# Patient Record
Sex: MALE | Race: OTHER | HISPANIC OR LATINO | Employment: OTHER | RURAL
[De-identification: names, ages, dates, MRNs, and addresses within clinical notes are randomized per-mention and may not be internally consistent; named-entity substitution may affect disease eponyms.]

---

## 2023-04-26 ENCOUNTER — OFFICE VISIT (OUTPATIENT)
Dept: FAMILY MEDICINE | Facility: CLINIC | Age: 73
End: 2023-04-26
Payer: MEDICARE

## 2023-04-26 VITALS
OXYGEN SATURATION: 97 % | SYSTOLIC BLOOD PRESSURE: 188 MMHG | WEIGHT: 150 LBS | DIASTOLIC BLOOD PRESSURE: 97 MMHG | BODY MASS INDEX: 24.99 KG/M2 | HEIGHT: 65 IN | TEMPERATURE: 98 F | HEART RATE: 69 BPM

## 2023-04-26 DIAGNOSIS — Z13.220 SCREENING FOR CHOLESTEROL LEVEL: ICD-10-CM

## 2023-04-26 DIAGNOSIS — Z11.59 ENCOUNTER FOR HEPATITIS C SCREENING TEST FOR LOW RISK PATIENT: ICD-10-CM

## 2023-04-26 DIAGNOSIS — Z13.6 ENCOUNTER FOR ABDOMINAL AORTIC ANEURYSM (AAA) SCREENING: Primary | ICD-10-CM

## 2023-04-26 DIAGNOSIS — Z12.11 ENCOUNTER FOR SCREENING FOR MALIGNANT NEOPLASM OF COLON: ICD-10-CM

## 2023-04-26 DIAGNOSIS — I10 PRIMARY HYPERTENSION: ICD-10-CM

## 2023-04-26 DIAGNOSIS — N40.0 BENIGN PROSTATIC HYPERPLASIA, UNSPECIFIED WHETHER LOWER URINARY TRACT SYMPTOMS PRESENT: ICD-10-CM

## 2023-04-26 PROCEDURE — 86803 HEPATITIS C AB TEST: CPT | Mod: ,,, | Performed by: CLINICAL MEDICAL LABORATORY

## 2023-04-26 PROCEDURE — 36415 COLL VENOUS BLD VENIPUNCTURE: CPT | Mod: ,,, | Performed by: CLINICAL MEDICAL LABORATORY

## 2023-04-26 PROCEDURE — 99205 OFFICE O/P NEW HI 60 MIN: CPT | Mod: GC,,, | Performed by: FAMILY MEDICINE

## 2023-04-26 PROCEDURE — 36415 PR COLLECTION VENOUS BLOOD,VENIPUNCTURE: ICD-10-PCS | Mod: ,,, | Performed by: CLINICAL MEDICAL LABORATORY

## 2023-04-26 PROCEDURE — 99205 PR OFFICE/OUTPT VISIT, NEW, LEVL V, 60-74 MIN: ICD-10-PCS | Mod: GC,,, | Performed by: FAMILY MEDICINE

## 2023-04-26 PROCEDURE — 80061 LIPID PANEL: ICD-10-PCS | Mod: ,,, | Performed by: CLINICAL MEDICAL LABORATORY

## 2023-04-26 PROCEDURE — 80061 LIPID PANEL: CPT | Mod: ,,, | Performed by: CLINICAL MEDICAL LABORATORY

## 2023-04-26 PROCEDURE — 86803 HEPATITIS C ANTIBODY: ICD-10-PCS | Mod: ,,, | Performed by: CLINICAL MEDICAL LABORATORY

## 2023-04-26 PROCEDURE — 99406 PR TOBACCO USE CESSATION INTERMEDIATE 3-10 MINUTES: ICD-10-PCS | Mod: GC,,, | Performed by: FAMILY MEDICINE

## 2023-04-26 PROCEDURE — 99406 BEHAV CHNG SMOKING 3-10 MIN: CPT | Mod: GC,,, | Performed by: FAMILY MEDICINE

## 2023-04-26 RX ORDER — AMLODIPINE BESYLATE 10 MG/1
10 TABLET ORAL DAILY
Qty: 90 TABLET | Refills: 6 | Status: SHIPPED | OUTPATIENT
Start: 2023-04-26 | End: 2023-10-16 | Stop reason: SDUPTHER

## 2023-04-26 RX ORDER — TAMSULOSIN HYDROCHLORIDE 0.4 MG/1
0.4 CAPSULE ORAL DAILY
Qty: 90 CAPSULE | Refills: 6 | Status: SHIPPED | OUTPATIENT
Start: 2023-04-26 | End: 2023-10-16 | Stop reason: SDUPTHER

## 2023-04-26 RX ORDER — LOSARTAN POTASSIUM 100 MG/1
100 TABLET ORAL DAILY
COMMUNITY
End: 2023-04-26 | Stop reason: SDUPTHER

## 2023-04-26 RX ORDER — AMLODIPINE BESYLATE 10 MG/1
10 TABLET ORAL DAILY
COMMUNITY
End: 2023-04-26 | Stop reason: SDUPTHER

## 2023-04-26 RX ORDER — LOSARTAN POTASSIUM 100 MG/1
100 TABLET ORAL DAILY
Qty: 90 TABLET | Refills: 6 | Status: SHIPPED | OUTPATIENT
Start: 2023-04-26 | End: 2023-10-16 | Stop reason: SDUPTHER

## 2023-04-26 RX ORDER — TAMSULOSIN HYDROCHLORIDE 0.4 MG/1
CAPSULE ORAL DAILY
COMMUNITY
End: 2023-04-26 | Stop reason: SDUPTHER

## 2023-04-26 NOTE — PROGRESS NOTES
Health Maintenance Due  Topic   Hepatitis C Screening  - ordered   Lipid Panel - ordered   COVID-19 Vaccine (1) - declined   TETANUS VACCINE - unable to give due to insurance   Colorectal Cancer Screening - ordered   Shingles Vaccine (1 of 2) - unable to give due to insurance   Pneumococcal Vaccines (Age 65+) (1 - PCV) - unable to give due to insurance

## 2023-04-27 LAB
CHOLEST SERPL-MCNC: 151 MG/DL (ref 0–200)
CHOLEST/HDLC SERPL: 3.2 {RATIO}
HCV AB SER QL: NORMAL
HDLC SERPL-MCNC: 47 MG/DL (ref 40–60)
LDLC SERPL CALC-MCNC: 77 MG/DL
LDLC/HDLC SERPL: 1.6 {RATIO}
NONHDLC SERPL-MCNC: 104 MG/DL
TRIGL SERPL-MCNC: 133 MG/DL (ref 35–150)
VLDLC SERPL-MCNC: 27 MG/DL

## 2023-05-02 PROBLEM — Z11.59 ENCOUNTER FOR HEPATITIS C SCREENING TEST FOR LOW RISK PATIENT: Status: ACTIVE | Noted: 2023-05-02

## 2023-05-02 PROBLEM — I10 PRIMARY HYPERTENSION: Status: ACTIVE | Noted: 2023-05-02

## 2023-05-02 PROBLEM — Z12.11 ENCOUNTER FOR SCREENING FOR MALIGNANT NEOPLASM OF COLON: Status: ACTIVE | Noted: 2023-05-02

## 2023-05-02 PROBLEM — N40.0 BENIGN PROSTATIC HYPERPLASIA: Status: ACTIVE | Noted: 2023-05-02

## 2023-05-02 PROBLEM — Z13.6 ENCOUNTER FOR ABDOMINAL AORTIC ANEURYSM (AAA) SCREENING: Status: ACTIVE | Noted: 2023-05-02

## 2023-05-02 PROBLEM — Z13.220 SCREENING FOR CHOLESTEROL LEVEL: Status: ACTIVE | Noted: 2023-05-02

## 2023-05-02 NOTE — PROGRESS NOTES
Subjective:       Patient ID: Johnny Haynes is a 73 y.o. male.    Chief Complaint: Establish Care    Johnny Haynes is a 73. y.o.m. with a PMH of HTN, BPH and Tobacco dependency that presents to the clinic today to establish care and for medication refills. Patient is a current smoker and was counseled on smoking for about 5-6 min. Astrid was given written documentation on steps to help stop smoking and was given the [facility] program brochure. Patient scheduled for AAA screening. Patient reports he has had his COVID vaccinations and will bring his card to f/u. Patient states he receive the pneumococcal vaccine at f/u. Patient referred to GI for screening c-scope. Upon examination patient left contains a mass between the 1st and second digit that extends on into the palm. Patient reports he has seen a hand specialist about the mass and he was told it was a lipoma and could be removed if he wanted it to. Patient reports the mass is not bothering him.          Current Outpatient Medications:     amLODIPine (NORVASC) 10 MG tablet, Take 1 tablet (10 mg total) by mouth once daily., Disp: 90 tablet, Rfl: 6    losartan (COZAAR) 100 MG tablet, Take 1 tablet (100 mg total) by mouth once daily., Disp: 90 tablet, Rfl: 6    tamsulosin (FLOMAX) 0.4 mg Cap, Take 1 capsule (0.4 mg total) by mouth once daily., Disp: 90 capsule, Rfl: 6    Review of patient's allergies indicates:  No Known Allergies    Past Medical History:   Diagnosis Date    Hypertension        Past Surgical History:   Procedure Laterality Date    CARDIAC SURGERY      COLONOSCOPY         Family History   Problem Relation Age of Onset    Heart disease Mother     Aneurysm Mother     Heart disease Father     Cancer Father     Diabetes Sister     Eating disorder Brother     Cancer Maternal Aunt     Heart disease Maternal Uncle     Aneurysm Maternal Grandmother     Diabetes Maternal Grandfather     Diabetes Paternal Grandfather        Social History      Tobacco Use    Smoking status: Every Day     Types: Cigarettes    Smokeless tobacco: Never   Substance Use Topics    Alcohol use: Not Currently    Drug use: Never       Review of Systems   Constitutional:  Negative for activity change, appetite change, chills and fever.   HENT:  Negative for nasal congestion, hearing loss, nosebleeds and trouble swallowing.    Eyes:  Negative for visual disturbance.   Respiratory:  Negative for cough and shortness of breath.    Cardiovascular:  Negative for chest pain and leg swelling.   Gastrointestinal:  Negative for abdominal pain, blood in stool, change in bowel habit, constipation, diarrhea, nausea, vomiting and change in bowel habit.   Endocrine: Negative for polyuria.   Genitourinary:  Negative for difficulty urinating, dysuria and frequency.   Musculoskeletal:  Negative for joint swelling and leg pain.   Integumentary:  Negative for rash.   Neurological:  Negative for dizziness, weakness, light-headedness and headaches.   All other systems reviewed and are negative.      Current Medications:   Medication List with Changes/Refills   Changed and/or Refilled Medications    Modified Medication Previous Medication    AMLODIPINE (NORVASC) 10 MG TABLET amLODIPine (NORVASC) 10 MG tablet       Take 1 tablet (10 mg total) by mouth once daily.    Take 10 mg by mouth once daily.       Start Date: 4/26/2023 End Date: --    Start Date: --        End Date: 4/26/2023    LOSARTAN (COZAAR) 100 MG TABLET losartan (COZAAR) 100 MG tablet       Take 1 tablet (100 mg total) by mouth once daily.    Take 100 mg by mouth once daily.       Start Date: 4/26/2023 End Date: --    Start Date: --        End Date: 4/26/2023    TAMSULOSIN (FLOMAX) 0.4 MG CAP tamsulosin (FLOMAX) 0.4 mg Cap       Take 1 capsule (0.4 mg total) by mouth once daily.    Take by mouth once daily.       Start Date: 4/26/2023 End Date: --    Start Date: --        End Date: 4/26/2023            Objective:        Vitals:     "04/26/23 1528   BP: (!) 188/97   BP Location: Right arm   Patient Position: Sitting   BP Method: Medium (Automatic)   Pulse: 69   Temp: 98 °F (36.7 °C)   TempSrc: Temporal   SpO2: 97%   Weight: 68 kg (150 lb)   Height: 5' 5" (1.651 m)       Physical Exam  Vitals and nursing note reviewed.   Constitutional:       General: He is not in acute distress.     Appearance: Normal appearance. He is not ill-appearing, toxic-appearing or diaphoretic.   HENT:      Head: Normocephalic and atraumatic.      Nose: Nose normal. No congestion or rhinorrhea.      Mouth/Throat:      Mouth: Mucous membranes are moist.      Pharynx: Oropharynx is clear. No oropharyngeal exudate or posterior oropharyngeal erythema.   Eyes:      Conjunctiva/sclera: Conjunctivae normal.      Pupils: Pupils are equal, round, and reactive to light.   Cardiovascular:      Rate and Rhythm: Normal rate and regular rhythm.      Pulses: Normal pulses.      Heart sounds: Normal heart sounds. No murmur heard.    No friction rub.   Pulmonary:      Effort: Pulmonary effort is normal. No respiratory distress.      Breath sounds: Normal breath sounds. No wheezing, rhonchi or rales.   Abdominal:      Tenderness: There is no abdominal tenderness. There is no guarding.   Musculoskeletal:      Right hand: Swelling present.      Right lower leg: No edema.      Left lower leg: No edema.      Comments: Mass of left hand   Skin:     Capillary Refill: Capillary refill takes less than 2 seconds.   Neurological:      General: No focal deficit present.      Mental Status: He is alert and oriented to person, place, and time.   Psychiatric:         Mood and Affect: Mood normal.         Behavior: Behavior normal.         Thought Content: Thought content normal.         Judgment: Judgment normal.           Lab Results   Component Value Date    CHOL 151 04/26/2023    TRIG 133 04/26/2023    HDL 47 04/26/2023      Assessment:       1. Encounter for abdominal aortic aneurysm (AAA) " screening    2. Primary hypertension    3. Benign prostatic hyperplasia, unspecified whether lower urinary tract symptoms present    4. Encounter for screening for malignant neoplasm of colon    5. Screening for cholesterol level    6. Encounter for hepatitis C screening test for low risk patient    7. Hyperlipidemia, unspecified        Plan:         Problem List Items Addressed This Visit    None  Visit Diagnoses       Encounter for abdominal aortic aneurysm (AAA) screening    -  Primary    Relevant Orders    US AAA Screening    Primary hypertension        Relevant Medications    losartan (COZAAR) 100 MG tablet    amLODIPine (NORVASC) 10 MG tablet    Benign prostatic hyperplasia, unspecified whether lower urinary tract symptoms present        Relevant Medications    tamsulosin (FLOMAX) 0.4 mg Cap    Encounter for screening for malignant neoplasm of colon        Relevant Orders    Ambulatory referral/consult to Gastroenterology    Screening for cholesterol level        Relevant Orders    Lipid Panel (Completed)    Encounter for hepatitis C screening test for low risk patient        Relevant Orders    Hepatitis C Antibody (Completed)    Hyperlipidemia, unspecified        Relevant Orders    Lipid Panel (Completed)              Follow up in about 5 weeks (around 5/31/2023).    Jovan Hsu DO     Instructed patient that if symptoms fail to improve or worsen patient should seek immediate medical attention or report to the nearest emergency department. Patient expressed verbal agreement and understanding to this plan of care.

## 2023-05-09 DIAGNOSIS — Z71.89 COMPLEX CARE COORDINATION: ICD-10-CM

## 2023-05-23 ENCOUNTER — HOSPITAL ENCOUNTER (OUTPATIENT)
Dept: RADIOLOGY | Facility: HOSPITAL | Age: 73
Discharge: HOME | End: 2023-05-23
Attending: FAMILY MEDICINE
Payer: MEDICARE

## 2023-05-23 DIAGNOSIS — Z13.6 ENCOUNTER FOR ABDOMINAL AORTIC ANEURYSM (AAA) SCREENING: ICD-10-CM

## 2023-05-23 PROCEDURE — 76706 US ABDL AORTA SCREEN AAA: CPT | Mod: 26,,, | Performed by: STUDENT IN AN ORGANIZED HEALTH CARE EDUCATION/TRAINING PROGRAM

## 2023-05-23 PROCEDURE — 76706 US AAA SCREENING: ICD-10-PCS | Mod: 26,,, | Performed by: STUDENT IN AN ORGANIZED HEALTH CARE EDUCATION/TRAINING PROGRAM

## 2023-05-23 PROCEDURE — 76706 US ABDL AORTA SCREEN AAA: CPT | Mod: TC

## 2023-05-31 ENCOUNTER — OFFICE VISIT (OUTPATIENT)
Dept: FAMILY MEDICINE | Facility: CLINIC | Age: 73
End: 2023-05-31
Payer: MEDICARE

## 2023-05-31 VITALS
BODY MASS INDEX: 26.99 KG/M2 | HEART RATE: 70 BPM | DIASTOLIC BLOOD PRESSURE: 76 MMHG | RESPIRATION RATE: 20 BRPM | HEIGHT: 65 IN | TEMPERATURE: 98 F | WEIGHT: 162 LBS | SYSTOLIC BLOOD PRESSURE: 138 MMHG | OXYGEN SATURATION: 96 %

## 2023-05-31 DIAGNOSIS — Z23 NEED FOR VACCINATION: ICD-10-CM

## 2023-05-31 DIAGNOSIS — L98.9 SKIN LESION: Primary | ICD-10-CM

## 2023-05-31 PROCEDURE — G0009 PNEUMOCOCCAL CONJUGATE VACCINE 20-VALENT: ICD-10-PCS | Mod: GC,,, | Performed by: FAMILY MEDICINE

## 2023-05-31 PROCEDURE — 99214 PR OFFICE/OUTPT VISIT, EST, LEVL IV, 30-39 MIN: ICD-10-PCS | Mod: GC,,, | Performed by: FAMILY MEDICINE

## 2023-05-31 PROCEDURE — 99214 OFFICE O/P EST MOD 30 MIN: CPT | Mod: GC,,, | Performed by: FAMILY MEDICINE

## 2023-05-31 PROCEDURE — 90677 PNEUMOCOCCAL CONJUGATE VACCINE 20-VALENT: ICD-10-PCS | Mod: GC,,, | Performed by: FAMILY MEDICINE

## 2023-05-31 PROCEDURE — 90677 PCV20 VACCINE IM: CPT | Mod: GC,,, | Performed by: FAMILY MEDICINE

## 2023-05-31 PROCEDURE — G0009 ADMIN PNEUMOCOCCAL VACCINE: HCPCS | Mod: GC,,, | Performed by: FAMILY MEDICINE

## 2023-05-31 NOTE — Clinical Note
Patient states he had a colonoscopy about 4 years ago at [facility] in [place], [place], Fax # [phone], can you please request records? Patient reports getting COVID vaccines at [facility] on [place], [place], Fax # [phone]. Can you please get COVID vaccine records?

## 2023-05-31 NOTE — PROGRESS NOTES
Health Maintenance Due  Topic     TETANUS VACCINE      Must get at pharmacy due to insurance   Shingles Vaccine (1 of 2)    Must get at pharmacy due to insurance

## 2023-06-01 NOTE — PROGRESS NOTES
Subjective:       Patient ID: Johnny Haynes is a 73 y.o. male.    Chief Complaint: Follow-up    Johnny Haynes is a 73. y.o.m. with a PMH of HTN, BPH and Tobacco dependency that presents to the clinic today for skin lesion on upper mid back. Skin lesion is black with irregular borders, raised and 1.5cm in diameter. Photos taken and sent to Dr. Carroll Chandler (dermatology). Dr. Carroll Chandler contacted me and believe the lesion may possible be a nodular melanoma. Patient referred to Dr. Carroll Chandler for excisional biopsy. Patient reports having colonoscopy and COVID vaccines in [place], records requested.        Current Outpatient Medications:     amLODIPine (NORVASC) 10 MG tablet, Take 1 tablet (10 mg total) by mouth once daily., Disp: 90 tablet, Rfl: 6    losartan (COZAAR) 100 MG tablet, Take 1 tablet (100 mg total) by mouth once daily., Disp: 90 tablet, Rfl: 6    tamsulosin (FLOMAX) 0.4 mg Cap, Take 1 capsule (0.4 mg total) by mouth once daily., Disp: 90 capsule, Rfl: 6    Review of patient's allergies indicates:  No Known Allergies    Past Medical History:   Diagnosis Date    Hypertension        Past Surgical History:   Procedure Laterality Date    CARDIAC SURGERY      COLONOSCOPY         Family History   Problem Relation Age of Onset    Heart disease Mother     Aneurysm Mother     Heart disease Father     Cancer Father     Diabetes Sister     Eating disorder Brother     Cancer Maternal Aunt     Heart disease Maternal Uncle     Aneurysm Maternal Grandmother     Diabetes Maternal Grandfather     Diabetes Paternal Grandfather        Social History     Tobacco Use    Smoking status: Every Day     Types: Cigarettes    Smokeless tobacco: Never   Substance Use Topics    Alcohol use: Not Currently    Drug use: Never       Review of Systems   Constitutional:  Negative for activity change, appetite change, chills and fever.   HENT:  Negative for nasal congestion, hearing loss, nosebleeds and trouble  "swallowing.    Eyes:  Negative for visual disturbance.   Respiratory:  Negative for cough and shortness of breath.    Cardiovascular:  Negative for chest pain and leg swelling.   Gastrointestinal:  Negative for abdominal pain, blood in stool, change in bowel habit, constipation, diarrhea, nausea, vomiting and change in bowel habit.   Endocrine: Negative for polyuria.   Genitourinary:  Negative for difficulty urinating, dysuria and frequency.   Musculoskeletal:  Negative for joint swelling and leg pain.   Integumentary:  Positive for mole/lesion. Negative for rash.   Neurological:  Negative for dizziness, weakness, light-headedness and headaches.   All other systems reviewed and are negative.      Current Medications:   Medication List with Changes/Refills   Current Medications    AMLODIPINE (NORVASC) 10 MG TABLET    Take 1 tablet (10 mg total) by mouth once daily.       Start Date: 4/26/2023 End Date: --    LOSARTAN (COZAAR) 100 MG TABLET    Take 1 tablet (100 mg total) by mouth once daily.       Start Date: 4/26/2023 End Date: --    TAMSULOSIN (FLOMAX) 0.4 MG CAP    Take 1 capsule (0.4 mg total) by mouth once daily.       Start Date: 4/26/2023 End Date: --            Objective:        Vitals:    05/31/23 1333 05/31/23 1429   BP: (!) 171/77 138/76   BP Location: Right arm Right arm   Patient Position: Sitting Sitting   BP Method: Medium (Automatic) Medium (Automatic)   Pulse: 70    Resp: 20    Temp: 98 °F (36.7 °C)    TempSrc: Temporal    SpO2: 96%    Weight: 73.5 kg (162 lb)    Height: 5' 5" (1.651 m)        Physical Exam  Vitals and nursing note reviewed.   Constitutional:       General: He is not in acute distress.     Appearance: Normal appearance. He is not ill-appearing, toxic-appearing or diaphoretic.   HENT:      Head: Normocephalic and atraumatic.      Nose: Nose normal. No congestion or rhinorrhea.      Mouth/Throat:      Mouth: Mucous membranes are moist.      Pharynx: Oropharynx is clear. No " oropharyngeal exudate or posterior oropharyngeal erythema.   Eyes:      Conjunctiva/sclera: Conjunctivae normal.      Pupils: Pupils are equal, round, and reactive to light.   Cardiovascular:      Rate and Rhythm: Normal rate and regular rhythm.      Pulses: Normal pulses.      Heart sounds: Normal heart sounds. No murmur heard.    No friction rub.   Pulmonary:      Effort: Pulmonary effort is normal. No respiratory distress.      Breath sounds: Normal breath sounds. No wheezing, rhonchi or rales.   Abdominal:      Tenderness: There is no abdominal tenderness. There is no guarding.   Musculoskeletal:      Right lower leg: No edema.      Left lower leg: No edema.   Skin:     Capillary Refill: Capillary refill takes less than 2 seconds.      Findings: Lesion present.          Neurological:      General: No focal deficit present.      Mental Status: He is alert and oriented to person, place, and time.   Psychiatric:         Mood and Affect: Mood normal.         Behavior: Behavior normal.         Thought Content: Thought content normal.         Judgment: Judgment normal.           Lab Results   Component Value Date    CHOL 151 04/26/2023    TRIG 133 04/26/2023    HDL 47 04/26/2023      Assessment:       1. Skin lesion    2. Need for vaccination        Plan:         Problem List Items Addressed This Visit    None  Visit Diagnoses       Skin lesion    -  Primary    Relevant Orders    Ambulatory referral/consult to Dermatology    Need for vaccination        Relevant Orders    (In Office Administered) Pneumococcal Conjugate Vaccine (20 Valent) (IM) (Completed)              Follow up in about 11 weeks (around 8/16/2023).    Jovan Hsu DO     Instructed patient that if symptoms fail to improve or worsen patient should seek immediate medical attention or report to the nearest emergency department. Patient expressed verbal agreement and understanding to this plan of care.

## 2023-06-05 ENCOUNTER — PATIENT OUTREACH (OUTPATIENT)
Dept: ADMINISTRATIVE | Facility: HOSPITAL | Age: 73
End: 2023-06-05

## 2023-06-05 NOTE — LETTER
AUTHORIZATION FOR RELEASE OF   CONFIDENTIAL INFORMATION    Dear [facility]    We are seeing Johnny Haynes, date of birth 1950, in the clinic at [facility]. Jovan Hsu DO is the patient's PCP. Johnny Haynes has an outstanding lab/procedure at the time we reviewed his chart. In order to help keep his health information updated, he has authorized us to request the following medical record(s):        (  )  MAMMOGRAM                                      (  )  COLONOSCOPY      (  )  PAP SMEAR                                          (  )  OUTSIDE LAB RESULTS     (  )  DEXA SCAN                                          (  )  EYE EXAM            (  )  FOOT EXAM                                          (  )  ENTIRE RECORD     (x  )  OUTSIDE IMMUNIZATIONS                 (  )  _______________         Please fax records to [facility], Steven Burkett, DO, [phone]     If you have any questions, please contact Tanner Figueroa LPN  Care Coordinator  Phone [phone]  Fax [phone]               Patient Name: Johnny Haynes  : 1950  Patient Phone #: [phone]

## 2023-06-05 NOTE — LETTER
AUTHORIZATION FOR RELEASE OF   CONFIDENTIAL INFORMATION    Dear [facility] ,    We are seeing Johnny Haynes, date of birth 1950, in the clinic at [facility]. Jovan Hsu DO is the patient's PCP. Johnny Haynes has an outstanding lab/procedure at the time we reviewed his chart. In order to help keep his health information updated, he has authorized us to request the following medical record(s):        (  )  MAMMOGRAM                                      (  X)  COLONOSCOPY      (  )  PAP SMEAR                                          (  )  OUTSIDE LAB RESULTS     (  )  DEXA SCAN                                          (  )  EYE EXAM            (  )  FOOT EXAM                                          (  )  ENTIRE RECORD     (  )  OUTSIDE IMMUNIZATIONS                 (  )  _______________         Please fax records to [facility], Steven Burkett, DO, [phone]     If you have any questions, please contact Tanner Figueroa LPN  Care Coordinator  Phone [phone]  Fax [phone]     .           Patient Name: Johnny Haynes  : 1950  Patient Phone #: [phone]

## 2023-06-07 NOTE — PATIENT INSTRUCTIONS

## 2023-06-08 ENCOUNTER — PROCEDURE VISIT (OUTPATIENT)
Dept: DERMATOLOGY | Facility: CLINIC | Age: 73
End: 2023-06-08
Payer: MEDICARE

## 2023-06-08 VITALS — DIASTOLIC BLOOD PRESSURE: 69 MMHG | HEART RATE: 71 BPM | SYSTOLIC BLOOD PRESSURE: 155 MMHG

## 2023-06-08 DIAGNOSIS — D48.9 NEOPLASM OF UNCERTAIN BEHAVIOR: Primary | ICD-10-CM

## 2023-06-08 DIAGNOSIS — L98.9 SKIN LESION: ICD-10-CM

## 2023-06-08 PROCEDURE — 99499 NO LOS: ICD-10-PCS | Mod: ,,, | Performed by: STUDENT IN AN ORGANIZED HEALTH CARE EDUCATION/TRAINING PROGRAM

## 2023-06-08 PROCEDURE — 99499 UNLISTED E&M SERVICE: CPT | Mod: ,,, | Performed by: STUDENT IN AN ORGANIZED HEALTH CARE EDUCATION/TRAINING PROGRAM

## 2023-06-08 PROCEDURE — 11604 EXC TR-EXT MAL+MARG 3.1-4 CM: CPT | Mod: ,,, | Performed by: STUDENT IN AN ORGANIZED HEALTH CARE EDUCATION/TRAINING PROGRAM

## 2023-06-08 PROCEDURE — 88342 PATHOLOGY, DERMATOLOGY: ICD-10-PCS | Mod: 26,,, | Performed by: PATHOLOGY

## 2023-06-08 PROCEDURE — 88305 PATHOLOGY, DERMATOLOGY: ICD-10-PCS | Mod: 26,,, | Performed by: PATHOLOGY

## 2023-06-08 PROCEDURE — 88305 TISSUE EXAM BY PATHOLOGIST: CPT | Mod: TC,SUR | Performed by: STUDENT IN AN ORGANIZED HEALTH CARE EDUCATION/TRAINING PROGRAM

## 2023-06-08 PROCEDURE — 88341 PATHOLOGY, DERMATOLOGY: ICD-10-PCS | Mod: 26,,, | Performed by: PATHOLOGY

## 2023-06-08 PROCEDURE — 88342 IMHCHEM/IMCYTCHM 1ST ANTB: CPT | Mod: 26,,, | Performed by: PATHOLOGY

## 2023-06-08 PROCEDURE — 88305 TISSUE EXAM BY PATHOLOGIST: CPT | Mod: 26,,, | Performed by: PATHOLOGY

## 2023-06-08 PROCEDURE — 12032 INTMD RPR S/A/T/EXT 2.6-7.5: CPT | Mod: 51,,, | Performed by: STUDENT IN AN ORGANIZED HEALTH CARE EDUCATION/TRAINING PROGRAM

## 2023-06-08 PROCEDURE — 12032 PR LAYR CLOS WND TRUNK,ARM,LEG 2.6-7.5 CM: ICD-10-PCS | Mod: 51,,, | Performed by: STUDENT IN AN ORGANIZED HEALTH CARE EDUCATION/TRAINING PROGRAM

## 2023-06-08 PROCEDURE — 88341 IMHCHEM/IMCYTCHM EA ADD ANTB: CPT | Mod: 26,,, | Performed by: PATHOLOGY

## 2023-06-08 PROCEDURE — 11604 PR EXC SKIN MALIG 3.1-4 CM TRUNK,ARM,LEG: ICD-10-PCS | Mod: ,,, | Performed by: STUDENT IN AN ORGANIZED HEALTH CARE EDUCATION/TRAINING PROGRAM

## 2023-06-08 NOTE — PROGRESS NOTES
Excision Consult Note    Johnny Haynes is a 73 y.o. male who is referred by Dr. Hsu for evaluation of a pigmented nodule on the upper mid back.     Recurrent skin cancer: No    Preoperative Risk Factors:  Current Anticoagulants: No  Endocarditis / Rheumatic Fever hx: No  Immunocompromised: No  Prosthetic joint: No  Congenital heart defect: No  Prosthetic heart valve: No  Diabetic: No  Transplant: No  Pacemaker: No  Defibrillator:  No  Prior problem with local anesthesia: No  Tobacco History: Yes] current  Clindamycin Allergy: No  Pregnant: no     Transmissible Diseases:  HIV No  Hepatitis B or C  No      Exam:  Limited skin exam is normal except for a pigmented nodule located on the upper mid back.    Pathologic Findings:  Accession # [id number]  Diagnosis: NUB, suspect melanoma    Assessment and Plan:  Treatment Options : Given the indications and high cure rate, the patient has agreed to proceed with excision  Risks and Benefits : The rationale for excision was explained to the patient. The risks and benefits to therapy were discussed in detail. Specifically, the risks of infection, scarring, bleeding, dehiscence, hematoma, prolonged wound healing, incomplete removal, allergy to anesthesia, nerve injury, inability to clear the lesion and recurrence were addressed. The treatment site was clearly identified and confirmed by the patient.    Plan:  Excisional biopsy, suspected     Carroll Chandler MD   Mohs Surgery/Dermatologic Oncology

## 2023-06-08 NOTE — PROGRESS NOTES
[facility]    Carroll Chandler MD    Elliptical Excision with Linear Closure    Tumor Type: NUB, suspect melanoma   Location:  upper mid back  Derm-Path Accession #:  [id number]  Lesion Size:  1.9 x 1.6 cm   Surgical Margins: 0.6 cm   Post op size: 3.1 x 2.8 cm   Level of Defect:  Fat  Repair Type:  Linear   Repair Length:  6 cm   Sutures: 3-0 PDS; 4-0 Prolene     Primary Surgeon: JAIME Chandler MD      INDICATIONS:  The risks of bleeding, infection, discomfort, incomplete removal, and scar formation were explained to the patient.  All questions were answered.  After informed consent, confirmation of site and identity, and appropriate instructions, the patient underwent the procedure as follows:    PROCEDURE:  With the patient in a supine position, the lesion was outlined with the above margins. An ellipse was designed around the lesion to conform to relaxed skin tension lines in an effort to minimize scarring and deformity.  The patient was then placed in a supine position.  The lesion and surrounding skin were prepped with chlorhexidine, draped, and anesthetized with 1% lidocaine with epinephrine 1:100,100 buffered with 1:10 sodium bicarbonate.  Using a #15 blade, the skin was excised along premarked lines.  The resulting defect extended through deep subcutaneous tissue.  Wound margins were undermined to limit functional deformity/impairment of adjacent structures.  Bleeding vessels were controlled with monopolar electrodessication. A deep placating retention suture was placed to offload tension to the deep margin. The dermis and subcutaneous tissue were closed with buried vertical mattress sutures.  Epidermal approximation was meticulously refined with simple running sutures, resulting in a linear closure with little to no wound tension.  Blood loss was estimated to be less than 5cc.  The area was coated with petrolatum and covered with a non-adherent dressing followed by gauze and tape.  Postoperative  instructions were reviewed per protocol.  The patient left alert and fully oriented.        Carroll Chandler MD

## 2023-06-19 ENCOUNTER — CLINICAL SUPPORT (OUTPATIENT)
Dept: DERMATOLOGY | Facility: CLINIC | Age: 73
End: 2023-06-19
Payer: MEDICARE

## 2023-06-19 DIAGNOSIS — Z48.02 VISIT FOR SUTURE REMOVAL: Primary | ICD-10-CM

## 2023-06-19 PROCEDURE — 99024 POSTOP FOLLOW-UP VISIT: CPT | Mod: ,,, | Performed by: STUDENT IN AN ORGANIZED HEALTH CARE EDUCATION/TRAINING PROGRAM

## 2023-06-19 PROCEDURE — 99024 PR POST-OP FOLLOW-UP VISIT: ICD-10-PCS | Mod: ,,, | Performed by: STUDENT IN AN ORGANIZED HEALTH CARE EDUCATION/TRAINING PROGRAM

## 2023-06-19 NOTE — PROGRESS NOTES
Elliptical Excision with Linear Closure     Tumor Type: NUB, suspect melanoma   Location:  upper mid back  Derm-Path Accession #:  [id number]  Lesion Size:  1.9 x 1.6 cm   Surgical Margins: 0.6 cm   Post op size: 3.1 x 2.8 cm   Level of Defect:  Fat  Repair Type:  Linear   Repair Length:  6 cm   Sutures: 3-0 PDS; 4-0 Prolene     Patient is here for SR. Incision is healing well no s/s of infection noted SR tolerated well. Patient is to return to clinic in 4 months for FSE.       Murali'Cr Limon, SONGN/IVC

## 2023-06-28 LAB
DHEA SERPL-MCNC: NORMAL
ESTROGEN SERPL-MCNC: NORMAL PG/ML
INSULIN SERPL-ACNC: NORMAL U[IU]/ML
LAB AP GROSS DESCRIPTION: NORMAL
LAB AP LABORATORY NOTES: NORMAL
LAB AP SPEC A DDX: NORMAL
LAB AP SPEC A MORPHOLOGY: NORMAL
LAB AP SPEC A PROCEDURE: NORMAL
LAB AP SYNOPTIC CHECKLIST: NORMAL
T3RU NFR SERPL: NORMAL %

## 2023-07-05 ENCOUNTER — OFFICE VISIT (OUTPATIENT)
Dept: FAMILY MEDICINE | Facility: CLINIC | Age: 73
End: 2023-07-05
Payer: MEDICARE

## 2023-07-05 ENCOUNTER — HOSPITAL ENCOUNTER (OUTPATIENT)
Dept: RADIOLOGY | Facility: HOSPITAL | Age: 73
Discharge: HOME | End: 2023-07-05
Attending: FAMILY MEDICINE
Payer: MEDICARE

## 2023-07-05 VITALS
WEIGHT: 162 LBS | HEART RATE: 63 BPM | OXYGEN SATURATION: 98 % | DIASTOLIC BLOOD PRESSURE: 60 MMHG | RESPIRATION RATE: 16 BRPM | BODY MASS INDEX: 26.99 KG/M2 | SYSTOLIC BLOOD PRESSURE: 150 MMHG | TEMPERATURE: 98 F | HEIGHT: 65 IN

## 2023-07-05 DIAGNOSIS — R22.1 NECK SWELLING: Primary | ICD-10-CM

## 2023-07-05 PROCEDURE — 99213 OFFICE O/P EST LOW 20 MIN: CPT | Mod: ,,, | Performed by: FAMILY MEDICINE

## 2023-07-05 PROCEDURE — 76536 US EXAM OF HEAD AND NECK: CPT | Mod: TC

## 2023-07-05 PROCEDURE — 76536 US SOFT TISSUE HEAD NECK THYROID: ICD-10-PCS | Mod: 26,,, | Performed by: RADIOLOGY

## 2023-07-05 PROCEDURE — 99213 PR OFFICE/OUTPT VISIT, EST, LEVL III, 20-29 MIN: ICD-10-PCS | Mod: ,,, | Performed by: FAMILY MEDICINE

## 2023-07-05 PROCEDURE — 96372 PR INJECTION,THERAP/PROPH/DIAG2ST, IM OR SUBCUT: ICD-10-PCS | Mod: ,,, | Performed by: FAMILY MEDICINE

## 2023-07-05 PROCEDURE — 76536 US EXAM OF HEAD AND NECK: CPT | Mod: 26,,, | Performed by: RADIOLOGY

## 2023-07-05 PROCEDURE — 96372 THER/PROPH/DIAG INJ SC/IM: CPT | Mod: ,,, | Performed by: FAMILY MEDICINE

## 2023-07-05 RX ORDER — DEXAMETHASONE SODIUM PHOSPHATE 4 MG/ML
4 INJECTION, SOLUTION INTRA-ARTICULAR; INTRALESIONAL; INTRAMUSCULAR; INTRAVENOUS; SOFT TISSUE
Status: COMPLETED | OUTPATIENT
Start: 2023-07-05 | End: 2023-07-05

## 2023-07-05 RX ORDER — DEXAMETHASONE SODIUM PHOSPHATE 4 MG/ML
4 INJECTION, SOLUTION INTRA-ARTICULAR; INTRALESIONAL; INTRAMUSCULAR; INTRAVENOUS; SOFT TISSUE
Status: DISCONTINUED | OUTPATIENT
Start: 2023-07-05 | End: 2023-07-05

## 2023-07-05 RX ADMIN — DEXAMETHASONE SODIUM PHOSPHATE 4 MG: 4 INJECTION, SOLUTION INTRA-ARTICULAR; INTRALESIONAL; INTRAMUSCULAR; INTRAVENOUS; SOFT TISSUE at 11:07

## 2023-07-05 NOTE — PROGRESS NOTES
Betty RIOSA  [address], [place] [address]  Phone: [phone]     Subjective     Name: Johnny Haynes   YOB: 1950 (73 y.o.)  MRN: [id number]  Visit Date: 7/5/2023   Chief Complaint: swelling and itching  (On front of neck under chin, n/c of known bite or trauma, symptoms since Monday)        HISTORY OF PRESENT ILLNESS:  HPI 74 y/o male presents with neck swelling. Pt noticed neck swelling on Monday. No trauma occurred. Only very mild pain (0-1/10) and pruritis. Pt believes swelling may be due to insect bite. Swelling has decrease significantly since Monday. Pt denies any new foods, medications, creams or ointments. No erythema or rash was noted. Will prescribe steroid injection to help reduce swelling and order ultrasound of the neck. Pt will follow up with Dr. Hsu in August. Will call patient with results of ultrasound.       PAST MEDICAL HISTORY:  Significant Diagnoses - Patient  has a past medical history of Hypertension.  Medications - Patient has a current medication list which includes the following long-term medication(s): amlodipine, losartan, and tamsulosin.   Allergies - Patient has No Known Allergies.  Surgeries - Patient  has a past surgical history that includes Colonoscopy and Cardiac surgery.  Family History - Patient family history includes Aneurysm in his maternal grandmother and mother; Cancer in his father and maternal aunt; Diabetes in his maternal grandfather, paternal grandfather, and sister; Eating disorder in his brother; Heart disease in his father, maternal uncle, and mother.      SOCIAL HISTORY:  Tobacco - Patient  reports that he has been smoking cigarettes. He has never used smokeless tobacco.   Alcohol - Patient  reports that he does not currently use alcohol.   Recreational Drugs - Patient  reports no history of drug use.       Review of Systems   HENT:  Negative for goiter.    Respiratory:  Negative for shortness of breath.   "  Integumentary:  Negative for rash.        Positive for pruritis and negative for erythema   Allergic/Immunologic: Negative for food allergies.        Past Medical History:   Diagnosis Date    Hypertension         Review of patient's allergies indicates:  No Known Allergies     Past Surgical History:   Procedure Laterality Date    CARDIAC SURGERY      COLONOSCOPY          Family History   Problem Relation Age of Onset    Heart disease Mother     Aneurysm Mother     Heart disease Father     Cancer Father     Diabetes Sister     Eating disorder Brother     Cancer Maternal Aunt     Heart disease Maternal Uncle     Aneurysm Maternal Grandmother     Diabetes Maternal Grandfather     Diabetes Paternal Grandfather        Current Outpatient Medications   Medication Instructions    amLODIPine (NORVASC) 10 mg, Oral, Daily    losartan (COZAAR) 100 mg, Oral, Daily    tamsulosin (FLOMAX) 0.4 mg, Oral, Daily        Objective     BP (!) 150/60 (BP Location: Left arm, Patient Position: Sitting, BP Method: Medium (Automatic))   Pulse 63   Temp 97.7 °F (36.5 °C) (Oral)   Resp 16   Ht 5' 5" (1.651 m)   Wt 73.5 kg (162 lb)   SpO2 98%   BMI 26.96 kg/m²     Physical Exam  Vitals and nursing note reviewed.   HENT:      Head: Normocephalic and atraumatic.   Neck:      Comments: Submandibular swelling without nodules or other masses  Pulmonary:      Effort: Pulmonary effort is normal.   Musculoskeletal:      Cervical back: Normal range of motion. No tenderness.   Neurological:      Mental Status: He is alert.        All recently obtained labs have been reviewed and discussed in detail with the patient.   Assessment     1. Neck swelling         Plan     Problem List Items Addressed This Visit    None  Visit Diagnoses       Neck swelling    -  Primary    Relevant Orders    US Soft Tissue Head Neck Thyroid              All orders:  Orders Placed This Encounter    US Soft Tissue Head Neck Thyroid    dexAMETHasone " injection 4 mg          No follow-ups on file.    Betty Li MD ADRIANA  [facility]

## 2023-08-21 ENCOUNTER — OFFICE VISIT (OUTPATIENT)
Dept: FAMILY MEDICINE | Facility: CLINIC | Age: 73
End: 2023-08-21
Payer: MEDICARE

## 2023-08-21 VITALS
WEIGHT: 162 LBS | BODY MASS INDEX: 26.99 KG/M2 | HEIGHT: 65 IN | DIASTOLIC BLOOD PRESSURE: 75 MMHG | SYSTOLIC BLOOD PRESSURE: 136 MMHG | HEART RATE: 58 BPM | TEMPERATURE: 98 F | OXYGEN SATURATION: 96 %

## 2023-08-21 DIAGNOSIS — Z86.39 PERSONAL HISTORY OF OTHER ENDOCRINE, NUTRITIONAL AND METABOLIC DISEASE: ICD-10-CM

## 2023-08-21 DIAGNOSIS — Z13.1 DIABETES MELLITUS SCREENING: ICD-10-CM

## 2023-08-21 DIAGNOSIS — R00.1 BRADYCARDIA: ICD-10-CM

## 2023-08-21 DIAGNOSIS — I10 PRIMARY HYPERTENSION: Primary | ICD-10-CM

## 2023-08-21 LAB
ALBUMIN SERPL BCP-MCNC: 3.8 G/DL (ref 3.5–5)
ALBUMIN/GLOB SERPL: 1 {RATIO}
ALP SERPL-CCNC: 143 U/L (ref 45–115)
ALT SERPL W P-5'-P-CCNC: 23 U/L (ref 16–61)
ANION GAP SERPL CALCULATED.3IONS-SCNC: 10 MMOL/L (ref 7–16)
AST SERPL W P-5'-P-CCNC: 17 U/L (ref 15–37)
BASOPHILS # BLD AUTO: 0.02 K/UL (ref 0–0.2)
BASOPHILS NFR BLD AUTO: 0.3 % (ref 0–1)
BILIRUB SERPL-MCNC: 0.6 MG/DL (ref ?–1.2)
BILIRUB SERPL-MCNC: ABNORMAL MG/DL
BLOOD URINE, POC: ABNORMAL
BUN SERPL-MCNC: 14 MG/DL (ref 7–18)
BUN/CREAT SERPL: 13 (ref 6–20)
CALCIUM SERPL-MCNC: 8.9 MG/DL (ref 8.5–10.1)
CHLORIDE SERPL-SCNC: 106 MMOL/L (ref 98–107)
CO2 SERPL-SCNC: 26 MMOL/L (ref 21–32)
COLOR, POC UA: YELLOW
CREAT SERPL-MCNC: 1.12 MG/DL (ref 0.7–1.3)
DIFFERENTIAL METHOD BLD: ABNORMAL
EGFR (NO RACE VARIABLE) (RUSH/TITUS): 69 ML/MIN/1.73M2
EOSINOPHIL # BLD AUTO: 0.06 K/UL (ref 0–0.5)
EOSINOPHIL NFR BLD AUTO: 1 % (ref 1–4)
ERYTHROCYTE [DISTWIDTH] IN BLOOD BY AUTOMATED COUNT: 13.8 % (ref 11.5–14.5)
EST. AVERAGE GLUCOSE BLD GHB EST-MCNC: 94 MG/DL
GLOBULIN SER-MCNC: 3.9 G/DL (ref 2–4)
GLUCOSE SERPL-MCNC: 90 MG/DL (ref 74–106)
GLUCOSE UR QL STRIP: ABNORMAL
HBA1C MFR BLD HPLC: 5.4 % (ref 4.5–6.6)
HCT VFR BLD AUTO: 44.7 % (ref 40–54)
HGB BLD-MCNC: 15.5 G/DL (ref 13.5–18)
IMM GRANULOCYTES # BLD AUTO: 0.05 K/UL (ref 0–0.04)
IMM GRANULOCYTES NFR BLD: 0.9 % (ref 0–0.4)
KETONES UR QL STRIP: ABNORMAL
LEUKOCYTE ESTERASE URINE, POC: ABNORMAL
LYMPHOCYTES # BLD AUTO: 1.44 K/UL (ref 1–4.8)
LYMPHOCYTES NFR BLD AUTO: 24.6 % (ref 27–41)
MCH RBC QN AUTO: 31.9 PG (ref 27–31)
MCHC RBC AUTO-ENTMCNC: 34.7 G/DL (ref 32–36)
MCV RBC AUTO: 92 FL (ref 80–96)
MONOCYTES # BLD AUTO: 0.78 K/UL (ref 0–0.8)
MONOCYTES NFR BLD AUTO: 13.3 % (ref 2–6)
MPC BLD CALC-MCNC: 9.3 FL (ref 9.4–12.4)
NEUTROPHILS # BLD AUTO: 3.51 K/UL (ref 1.8–7.7)
NEUTROPHILS NFR BLD AUTO: 59.9 % (ref 53–65)
NITRITE, POC UA: ABNORMAL
NRBC # BLD AUTO: 0 X10E3/UL
NRBC, AUTO (.00): 0 %
PH, POC UA: 5.5
PLATELET # BLD AUTO: 273 K/UL (ref 150–400)
POTASSIUM SERPL-SCNC: 4.1 MMOL/L (ref 3.5–5.1)
PROT SERPL-MCNC: 7.7 G/DL (ref 6.4–8.2)
PROTEIN, POC: ABNORMAL
RBC # BLD AUTO: 4.86 M/UL (ref 4.6–6.2)
SODIUM SERPL-SCNC: 138 MMOL/L (ref 136–145)
SPECIFIC GRAVITY, POC UA: 1.02
UROBILINOGEN, POC UA: 0.2
WBC # BLD AUTO: 5.86 K/UL (ref 4.5–11)

## 2023-08-21 PROCEDURE — 93010 PR ELECTROCARDIOGRAM REPORT: ICD-10-PCS | Mod: GC,,,

## 2023-08-21 PROCEDURE — 80053 COMPREHEN METABOLIC PANEL: CPT | Mod: ,,, | Performed by: CLINICAL MEDICAL LABORATORY

## 2023-08-21 PROCEDURE — 80053 COMPREHENSIVE METABOLIC PANEL: ICD-10-PCS | Mod: ,,, | Performed by: CLINICAL MEDICAL LABORATORY

## 2023-08-21 PROCEDURE — 99406 BEHAV CHNG SMOKING 3-10 MIN: CPT | Mod: GC,,, | Performed by: FAMILY MEDICINE

## 2023-08-21 PROCEDURE — 83036 HEMOGLOBIN GLYCOSYLATED A1C: CPT | Mod: GZ,,, | Performed by: CLINICAL MEDICAL LABORATORY

## 2023-08-21 PROCEDURE — 81003 URINALYSIS AUTO W/O SCOPE: CPT | Mod: RHCUB

## 2023-08-21 PROCEDURE — 85025 CBC WITH DIFFERENTIAL: ICD-10-PCS | Mod: ,,, | Performed by: CLINICAL MEDICAL LABORATORY

## 2023-08-21 PROCEDURE — 99213 PR OFFICE/OUTPT VISIT, EST, LEVL III, 20-29 MIN: ICD-10-PCS | Mod: GC,,, | Performed by: FAMILY MEDICINE

## 2023-08-21 PROCEDURE — 83036 HEMOGLOBIN A1C: ICD-10-PCS | Mod: GZ,,, | Performed by: CLINICAL MEDICAL LABORATORY

## 2023-08-21 PROCEDURE — 99213 OFFICE O/P EST LOW 20 MIN: CPT | Mod: GC,,, | Performed by: FAMILY MEDICINE

## 2023-08-21 PROCEDURE — 99406 PR TOBACCO USE CESSATION INTERMEDIATE 3-10 MINUTES: ICD-10-PCS | Mod: GC,,, | Performed by: FAMILY MEDICINE

## 2023-08-21 PROCEDURE — 85025 COMPLETE CBC W/AUTO DIFF WBC: CPT | Mod: ,,, | Performed by: CLINICAL MEDICAL LABORATORY

## 2023-08-21 PROCEDURE — 93010 ELECTROCARDIOGRAM REPORT: CPT | Mod: GC,,,

## 2023-08-21 PROCEDURE — 93005 ELECTROCARDIOGRAM TRACING: CPT | Mod: RHCUB

## 2023-08-23 NOTE — PROGRESS NOTES
Subjective:       Patient ID: Johnny Haynes is a 73 y.o. male.    Chief Complaint: Follow-up    Johnny Haynes is a 73. y.o.m. with a PMH of HTN, BPH and Tobacco dependency that presents to the clinic today for f/u of neck swelling. U/S of the swelling was unremarkable. Swelling has resolved. HR x2 was low at 62 and 58. EKG obtained that showed bradycardia, left axis deviation, with RBBB. Highly considering cardiac work up as there is no previous cardiac records. Labs drawn today as there are none on file. A1c drawn for DM screening and POC u/a obtained to check level of urine. Patient counseled on smoking cessation for about 6 mins. Patient given documentation containing smoking cessation tip and the Quit line number. Patient also give [facility] rian. Unable to find colonoscopy records for patient. Will refer to GI for colonoscopy.         Current Outpatient Medications:     amLODIPine (NORVASC) 10 MG tablet, Take 1 tablet (10 mg total) by mouth once daily., Disp: 90 tablet, Rfl: 6    losartan (COZAAR) 100 MG tablet, Take 1 tablet (100 mg total) by mouth once daily., Disp: 90 tablet, Rfl: 6    tamsulosin (FLOMAX) 0.4 mg Cap, Take 1 capsule (0.4 mg total) by mouth once daily., Disp: 90 capsule, Rfl: 6    Review of patient's allergies indicates:  No Known Allergies    Past Medical History:   Diagnosis Date    Hypertension        Past Surgical History:   Procedure Laterality Date    CARDIAC SURGERY      COLONOSCOPY         Family History   Problem Relation Age of Onset    Heart disease Mother     Aneurysm Mother     Heart disease Father     Cancer Father     Diabetes Sister     Eating disorder Brother     Cancer Maternal Aunt     Heart disease Maternal Uncle     Aneurysm Maternal Grandmother     Diabetes Maternal Grandfather     Diabetes Paternal Grandfather        Social History     Tobacco Use    Smoking status: Every Day     Current packs/day: 0.00     Types: Cigarettes    Smokeless tobacco: Never   Substance  "Use Topics    Alcohol use: Not Currently    Drug use: Never       Review of Systems   Constitutional:  Negative for activity change, appetite change, chills and fever.   HENT:  Negative for nasal congestion, hearing loss, sore throat and trouble swallowing.    Eyes:  Negative for visual disturbance.   Respiratory:  Negative for cough and shortness of breath.    Cardiovascular:  Negative for chest pain and leg swelling.   Gastrointestinal:  Negative for abdominal pain, blood in stool, change in bowel habit, constipation, diarrhea, nausea, vomiting and change in bowel habit.   Endocrine: Negative for polyuria.   Genitourinary:  Negative for difficulty urinating, dysuria and frequency.   Musculoskeletal:  Negative for joint swelling and leg pain.   Integumentary:  Negative for rash.   Neurological:  Negative for dizziness, weakness, light-headedness and headaches.   All other systems reviewed and are negative.        Current Medications:   Medication List with Changes/Refills   Current Medications    AMLODIPINE (NORVASC) 10 MG TABLET    Take 1 tablet (10 mg total) by mouth once daily.       Start Date: 4/26/2023 End Date: --    LOSARTAN (COZAAR) 100 MG TABLET    Take 1 tablet (100 mg total) by mouth once daily.       Start Date: 4/26/2023 End Date: --    TAMSULOSIN (FLOMAX) 0.4 MG CAP    Take 1 capsule (0.4 mg total) by mouth once daily.       Start Date: 4/26/2023 End Date: --            Objective:        Vitals:    08/21/23 1328 08/21/23 1330   BP: (!) 152/81 136/75   BP Location: Right arm Right forearm   Patient Position: Sitting Sitting   BP Method: Medium (Automatic) Medium (Automatic)   Pulse: 61 (!) 58   Temp: 98.1 °F (36.7 °C)    TempSrc: Temporal    SpO2: 96%    Weight: 73.5 kg (162 lb)    Height: 5' 5" (1.651 m)        Physical Exam  Vitals and nursing note reviewed.   Constitutional:       General: He is not in acute distress.     Appearance: Normal appearance. He is not ill-appearing, toxic-appearing or " diaphoretic.   HENT:      Head: Normocephalic and atraumatic.      Nose: Nose normal. No congestion or rhinorrhea.      Mouth/Throat:      Mouth: Mucous membranes are moist.      Pharynx: Oropharynx is clear. No oropharyngeal exudate or posterior oropharyngeal erythema.   Eyes:      Conjunctiva/sclera: Conjunctivae normal.      Pupils: Pupils are equal, round, and reactive to light.   Cardiovascular:      Rate and Rhythm: Normal rate and regular rhythm.      Pulses: Normal pulses.      Heart sounds: Normal heart sounds. No murmur heard.     No friction rub.   Pulmonary:      Effort: Pulmonary effort is normal. No respiratory distress.      Breath sounds: Normal breath sounds. No wheezing, rhonchi or rales.   Abdominal:      Tenderness: There is no abdominal tenderness. There is no guarding.   Musculoskeletal:      Cervical back: No tenderness.      Right lower leg: No edema.      Left lower leg: No edema.   Lymphadenopathy:      Cervical: No cervical adenopathy.   Skin:     Capillary Refill: Capillary refill takes less than 2 seconds.   Neurological:      General: No focal deficit present.      Mental Status: He is alert and oriented to person, place, and time.   Psychiatric:         Mood and Affect: Mood normal.         Behavior: Behavior normal.         Thought Content: Thought content normal.         Judgment: Judgment normal.             Lab Results   Component Value Date    WBC 5.86 08/21/2023    HGB 15.5 08/21/2023    HCT 44.7 08/21/2023     08/21/2023    CHOL 151 04/26/2023    TRIG 133 04/26/2023    HDL 47 04/26/2023    ALT 23 08/21/2023    AST 17 08/21/2023     08/21/2023    K 4.1 08/21/2023     08/21/2023    CREATININE 1.12 08/21/2023    BUN 14 08/21/2023    CO2 26 08/21/2023    HGBA1C 5.4 08/21/2023      Assessment:       1. Primary hypertension    2. Bradycardia    3. Diabetes mellitus screening    4. Personal history of other endocrine, nutritional and metabolic disease        Plan:          Problem List Items Addressed This Visit          Cardiac/Vascular    Primary hypertension - Primary    Relevant Orders    CBC Auto Differential (Completed)    Comprehensive Metabolic Panel (Completed)    POCT URINALYSIS W/O SCOPE (Completed)     Other Visit Diagnoses       Bradycardia        Relevant Orders    POCT EKG 12-LEAD (Manually Resulted by Ordering Provider)    Diabetes mellitus screening        Relevant Orders    Hemoglobin A1C (Completed)    Personal history of other endocrine, nutritional and metabolic disease        Relevant Orders    Hemoglobin A1C (Completed)              Follow up in about 8 weeks (around 10/16/2023).    Jovan Hsu DO     Instructed patient that if symptoms fail to improve or worsen patient should seek immediate medical attention or report to the nearest emergency department. Patient expressed verbal agreement and understanding to this plan of care.

## 2023-09-01 LAB
EKG 12-LEAD: NORMAL
PR INTERVAL: 166
PRT AXES: NORMAL
QRS DURATION: 124
QT/QTC: NORMAL
VENTRICULAR RATE: 58

## 2023-10-16 ENCOUNTER — OFFICE VISIT (OUTPATIENT)
Dept: FAMILY MEDICINE | Facility: CLINIC | Age: 73
End: 2023-10-16
Payer: MEDICARE

## 2023-10-16 VITALS
SYSTOLIC BLOOD PRESSURE: 152 MMHG | OXYGEN SATURATION: 98 % | DIASTOLIC BLOOD PRESSURE: 79 MMHG | BODY MASS INDEX: 26.99 KG/M2 | HEIGHT: 65 IN | TEMPERATURE: 97 F | HEART RATE: 68 BPM | WEIGHT: 162 LBS

## 2023-10-16 DIAGNOSIS — G47.00 INSOMNIA, UNSPECIFIED TYPE: ICD-10-CM

## 2023-10-16 DIAGNOSIS — Z12.5 ENCOUNTER FOR SCREENING FOR MALIGNANT NEOPLASM OF PROSTATE: ICD-10-CM

## 2023-10-16 DIAGNOSIS — N40.0 BENIGN PROSTATIC HYPERPLASIA, UNSPECIFIED WHETHER LOWER URINARY TRACT SYMPTOMS PRESENT: ICD-10-CM

## 2023-10-16 DIAGNOSIS — Z12.9 MALIGNANT NEOPLASM SCREEN: Primary | ICD-10-CM

## 2023-10-16 DIAGNOSIS — F17.200 TOBACCO DEPENDENCE: ICD-10-CM

## 2023-10-16 DIAGNOSIS — I10 PRIMARY HYPERTENSION: ICD-10-CM

## 2023-10-16 DIAGNOSIS — Z28.21 VACCINATION NOT CARRIED OUT BECAUSE OF PATIENT REFUSAL: ICD-10-CM

## 2023-10-16 LAB
CREAT UR-MCNC: 145 MG/DL (ref 39–259)
MICROALBUMIN UR-MCNC: 2.7 MG/DL (ref 0–2.8)
MICROALBUMIN/CREAT RATIO PNL UR: 18.6 MG/G (ref 0–30)
PSA SERPL-MCNC: 9.89 NG/ML

## 2023-10-16 PROCEDURE — 82043 UR ALBUMIN QUANTITATIVE: CPT | Mod: ,,, | Performed by: CLINICAL MEDICAL LABORATORY

## 2023-10-16 PROCEDURE — G0103 PSA SCREENING: HCPCS | Mod: ,,, | Performed by: CLINICAL MEDICAL LABORATORY

## 2023-10-16 PROCEDURE — 82570 MICROALBUMIN / CREATININE RATIO URINE: ICD-10-PCS | Mod: ,,, | Performed by: CLINICAL MEDICAL LABORATORY

## 2023-10-16 PROCEDURE — 82043 MICROALBUMIN / CREATININE RATIO URINE: ICD-10-PCS | Mod: ,,, | Performed by: CLINICAL MEDICAL LABORATORY

## 2023-10-16 PROCEDURE — 99406 PR TOBACCO USE CESSATION INTERMEDIATE 3-10 MINUTES: ICD-10-PCS | Mod: GC,,, | Performed by: SPECIALIST

## 2023-10-16 PROCEDURE — 99406 BEHAV CHNG SMOKING 3-10 MIN: CPT | Mod: GC,,, | Performed by: SPECIALIST

## 2023-10-16 PROCEDURE — 82570 ASSAY OF URINE CREATININE: CPT | Mod: ,,, | Performed by: CLINICAL MEDICAL LABORATORY

## 2023-10-16 PROCEDURE — 99214 OFFICE O/P EST MOD 30 MIN: CPT | Mod: GC,,, | Performed by: SPECIALIST

## 2023-10-16 PROCEDURE — G0103 PSA, SCREENING: ICD-10-PCS | Mod: ,,, | Performed by: CLINICAL MEDICAL LABORATORY

## 2023-10-16 PROCEDURE — 99214 PR OFFICE/OUTPT VISIT, EST, LEVL IV, 30-39 MIN: ICD-10-PCS | Mod: GC,,, | Performed by: SPECIALIST

## 2023-10-16 RX ORDER — TAMSULOSIN HYDROCHLORIDE 0.4 MG/1
0.4 CAPSULE ORAL DAILY
Qty: 90 CAPSULE | Refills: 6 | Status: SHIPPED | OUTPATIENT
Start: 2023-10-16

## 2023-10-16 RX ORDER — AMLODIPINE BESYLATE 10 MG/1
10 TABLET ORAL DAILY
Qty: 90 TABLET | Refills: 6 | Status: SHIPPED | OUTPATIENT
Start: 2023-10-16

## 2023-10-16 RX ORDER — LOSARTAN POTASSIUM 100 MG/1
100 TABLET ORAL DAILY
Qty: 90 TABLET | Refills: 6 | Status: SHIPPED | OUTPATIENT
Start: 2023-10-16

## 2023-10-16 RX ORDER — TRAZODONE HYDROCHLORIDE 50 MG/1
50 TABLET ORAL NIGHTLY
Qty: 30 TABLET | Refills: 11 | Status: SHIPPED | OUTPATIENT
Start: 2023-10-16 | End: 2024-10-15

## 2023-10-17 NOTE — PROGRESS NOTES
"Subjective:       Patient ID: Johnny Haynes is a 73 y.o. male.    Chief Complaint: Follow-up, Hypertension, Diabetes, and Bradycardia    Johnny Haynes is a 73. y.o.m. with a PMH of HTN, BPH and Tobacco dependency that presents to the clinic today for f/u of Bradycardia, HTN and DM. Patient was found to be bradycardic at last visit with EKG demonstrating left axis deviation, with RBBB. Patient remains asymptomatic denying SOB, dizziness, or syncope. Cardiac consult pending patient becoming symptomatic. Patient's HTN continues to be uncontrolled. Patient refuses to have HTN medications adjusted or added at during this visit. POC u/a obtained to monitor for proteinuria. Patient's A1c 1 month ago was 5.4% demonstrating well controlled blood sugars. Patient is currently not taking any DM medications. Patient reports urine frequency stating he has to urinate about every 2 hours. When as if he feels like he is emptying his bladder completely, he reply he is "unsure". Patient is currently on Flomax 0.4mg QD. Patient instructed to try increasing the Flomax to 0.8mg QD and monitor for frequency improvement. Patient reports he thought he has seen a urologist At [facility]. [facility] has no record of the patient ever being seen there. Will draw a PSA and refer to urology. Patient reports insomnia, stating he has trouble falling asleep and staying asleep. Patient denies taking daytime naps or drinking excessive caffeine. Patient reports he has tried melatonin without improvement. Patient agreed to start trial of trazodone after side effects were discussed. Smoking cessation discussed as before but he does not appear to be very interested in quitting. Patient was instructed to consider a quit date and report it at f/u. Smoking cessation discussed for 3 minutes. Patient stated at a previous visit that he had a colonoscopy in [place] about 4 - 5 years ago. He reports shortly after the colonoscopy was " preformed he had to have his entire body scanned but states the scan was normal. After calling every hospital/clinic in the area patient reports he had his colonoscopy preformed and calling every hospital/clinic in the area where he reports he had his entire body scan preformed, without any of the hospitals/clinics having a record of the patient's colonoscopy or full body scan it was recommended patient have colonoscopy repeated. Patient refused a colonoscopy due to having a bad experience with previous colonoscopy. Patient did agree to Cologuard test but refused to preform FOBT x3. Patient refused COVID and Influenza vaccine.         Current Outpatient Medications:     amLODIPine (NORVASC) 10 MG tablet, Take 1 tablet (10 mg total) by mouth once daily., Disp: 90 tablet, Rfl: 6    losartan (COZAAR) 100 MG tablet, Take 1 tablet (100 mg total) by mouth once daily., Disp: 90 tablet, Rfl: 6    tamsulosin (FLOMAX) 0.4 mg Cap, Take 1 capsule (0.4 mg total) by mouth once daily., Disp: 90 capsule, Rfl: 6    traZODone (DESYREL) 50 MG tablet, Take 1 tablet (50 mg total) by mouth every evening., Disp: 30 tablet, Rfl: 11    Review of patient's allergies indicates:  No Known Allergies    Past Medical History:   Diagnosis Date    Hypertension        Past Surgical History:   Procedure Laterality Date    CARDIAC SURGERY      COLONOSCOPY         Family History   Problem Relation Age of Onset    Heart disease Mother     Aneurysm Mother     Heart disease Father     Cancer Father     Diabetes Sister     Eating disorder Brother     Cancer Maternal Aunt     Heart disease Maternal Uncle     Aneurysm Maternal Grandmother     Diabetes Maternal Grandfather     Diabetes Paternal Grandfather        Social History     Tobacco Use    Smoking status: Every Day     Types: Cigarettes    Smokeless tobacco: Never   Substance Use Topics    Alcohol use: Not Currently    Drug use: Never       Review of Systems   Constitutional:  Negative for activity  change, appetite change, chills and fever.   HENT:  Negative for nasal congestion, rhinorrhea, sinus pressure/congestion, sneezing, sore throat and trouble swallowing.    Eyes:  Negative for visual disturbance.   Respiratory:  Negative for cough and shortness of breath.    Cardiovascular:  Negative for chest pain and leg swelling.   Gastrointestinal:  Negative for abdominal pain, blood in stool, change in bowel habit, constipation, diarrhea, nausea and vomiting.   Endocrine: Negative for polyuria.   Genitourinary:  Positive for frequency. Negative for difficulty urinating and dysuria.   Musculoskeletal:  Negative for joint swelling and leg pain.   Integumentary:  Negative for rash.   Neurological:  Negative for dizziness, syncope, weakness, light-headedness and headaches.   Psychiatric/Behavioral:  Positive for sleep disturbance.    All other systems reviewed and are negative.        Current Medications:   Medication List with Changes/Refills   New Medications    TRAZODONE (DESYREL) 50 MG TABLET    Take 1 tablet (50 mg total) by mouth every evening.       Start Date: 10/16/2023End Date: 10/15/2024   Changed and/or Refilled Medications    Modified Medication Previous Medication    AMLODIPINE (NORVASC) 10 MG TABLET amLODIPine (NORVASC) 10 MG tablet       Take 1 tablet (10 mg total) by mouth once daily.    Take 1 tablet (10 mg total) by mouth once daily.       Start Date: 10/16/2023End Date: --    Start Date: 4/26/2023 End Date: 10/16/2023    LOSARTAN (COZAAR) 100 MG TABLET losartan (COZAAR) 100 MG tablet       Take 1 tablet (100 mg total) by mouth once daily.    Take 1 tablet (100 mg total) by mouth once daily.       Start Date: 10/16/2023End Date: --    Start Date: 4/26/2023 End Date: 10/16/2023    TAMSULOSIN (FLOMAX) 0.4 MG CAP tamsulosin (FLOMAX) 0.4 mg Cap       Take 1 capsule (0.4 mg total) by mouth once daily.    Take 1 capsule (0.4 mg total) by mouth once daily.       Start Date: 10/16/2023End Date: --    Start  "Date: 4/26/2023 End Date: 10/16/2023            Objective:        Vitals:    10/16/23 1331 10/16/23 1336   BP: (!) 158/79 (!) 152/79   BP Location: Left arm Left arm   Patient Position: Sitting Sitting   BP Method: Medium (Automatic) Medium (Automatic)   Pulse: 68 68   Temp: 97.1 °F (36.2 °C)    TempSrc: Temporal    SpO2: 98%    Weight: 73.5 kg (162 lb)    Height: 5' 5" (1.651 m)        Physical Exam  Vitals and nursing note reviewed.   Constitutional:       General: He is not in acute distress.     Appearance: Normal appearance. He is not ill-appearing, toxic-appearing or diaphoretic.   HENT:      Head: Normocephalic and atraumatic.      Nose: Nose normal. No congestion or rhinorrhea.      Mouth/Throat:      Mouth: Mucous membranes are moist.      Pharynx: Oropharynx is clear. No oropharyngeal exudate or posterior oropharyngeal erythema.   Eyes:      Conjunctiva/sclera: Conjunctivae normal.      Pupils: Pupils are equal, round, and reactive to light.   Cardiovascular:      Rate and Rhythm: Normal rate and regular rhythm.      Pulses: Normal pulses.      Heart sounds: Normal heart sounds. No murmur heard.     No friction rub.   Pulmonary:      Effort: Pulmonary effort is normal. No respiratory distress.      Breath sounds: Normal breath sounds. No wheezing, rhonchi or rales.   Abdominal:      General: There is no distension.      Tenderness: There is no abdominal tenderness. There is no guarding.   Musculoskeletal:      Cervical back: No tenderness.      Right lower leg: No edema.      Left lower leg: No edema.   Lymphadenopathy:      Cervical: No cervical adenopathy.   Skin:     General: Skin is warm and dry.      Capillary Refill: Capillary refill takes less than 2 seconds.   Neurological:      General: No focal deficit present.      Mental Status: He is alert and oriented to person, place, and time.   Psychiatric:         Mood and Affect: Mood normal.         Behavior: Behavior normal.         Thought Content: " Thought content normal.         Judgment: Judgment normal.             Lab Results   Component Value Date    WBC 5.86 08/21/2023    HGB 15.5 08/21/2023    HCT 44.7 08/21/2023     08/21/2023    CHOL 151 04/26/2023    TRIG 133 04/26/2023    HDL 47 04/26/2023    ALT 23 08/21/2023    AST 17 08/21/2023     08/21/2023    K 4.1 08/21/2023     08/21/2023    CREATININE 1.12 08/21/2023    BUN 14 08/21/2023    CO2 26 08/21/2023    PSA 9.890 (H) 10/16/2023    HGBA1C 5.4 08/21/2023    MICROALBUR 2.7 10/16/2023      Assessment:       1. Malignant neoplasm screen    2. Benign prostatic hyperplasia, unspecified whether lower urinary tract symptoms present    3. Primary hypertension    4. Insomnia, unspecified type    5. Encounter for screening for malignant neoplasm of prostate    6. Vaccination not carried out because of patient refusal    7. Tobacco dependence        Plan:         Problem List Items Addressed This Visit          Cardiac/Vascular    Primary hypertension     Patient refused to have HTN medications adjusted or added.          Relevant Medications    losartan (COZAAR) 100 MG tablet    amLODIPine (NORVASC) 10 MG tablet    Other Relevant Orders    Microalbumin/Creatinine Ratio, Urine (Completed)       Renal/    Benign prostatic hyperplasia    Relevant Medications    tamsulosin (FLOMAX) 0.4 mg Cap    Other Relevant Orders    PSA, Screening (Completed)    Ambulatory referral/consult to Urology       ID    Vaccination not carried out because of patient refusal     Patient refused influenza and COVID vaccines.             Oncology    Malignant neoplasm screen - Primary    Relevant Orders    Cologuard Screening (Multitarget Stool DNA)    Ambulatory referral/consult to Urology       Other    Insomnia    Relevant Medications    traZODone (DESYREL) 50 MG tablet    Tobacco dependence     Smoking cessation discussed as before but he does not appear to be very interested in quitting. Patient was instructed to  consider a quit date and report it at f/u. Smoking cessation discussed for 3 minutes.              Follow up in about 3 months (around 1/16/2024).    Jovan Hsu DO     Instructed patient that if symptoms fail to improve or worsen patient should seek immediate medical attention or report to the nearest emergency department. Patient expressed verbal agreement and understanding to this plan of care.

## 2023-10-19 PROBLEM — Z28.21 VACCINATION NOT CARRIED OUT BECAUSE OF PATIENT REFUSAL: Status: ACTIVE | Noted: 2023-10-19

## 2023-10-19 PROBLEM — Z12.9: Status: ACTIVE | Noted: 2023-05-02

## 2023-10-19 PROBLEM — Z12.5 ENCOUNTER FOR SCREENING FOR MALIGNANT NEOPLASM OF PROSTATE: Status: ACTIVE | Noted: 2023-05-02

## 2023-10-19 PROBLEM — G47.00 INSOMNIA: Status: ACTIVE | Noted: 2023-10-19

## 2023-10-19 PROBLEM — F17.200 TOBACCO DEPENDENCE: Status: ACTIVE | Noted: 2023-10-19

## 2023-10-19 NOTE — ASSESSMENT & PLAN NOTE
Smoking cessation discussed as before but he does not appear to be very interested in quitting. Patient was instructed to consider a quit date and report it at f/u. Smoking cessation discussed for 3 minutes.

## 2023-10-20 ENCOUNTER — OFFICE VISIT (OUTPATIENT)
Dept: DERMATOLOGY | Facility: CLINIC | Age: 73
End: 2023-10-20
Payer: MEDICARE

## 2023-10-20 DIAGNOSIS — D48.9 NEOPLASM OF UNCERTAIN BEHAVIOR: Primary | ICD-10-CM

## 2023-10-20 DIAGNOSIS — L82.1 SK (SEBORRHEIC KERATOSIS): ICD-10-CM

## 2023-10-20 DIAGNOSIS — Z85.820 HISTORY OF MELANOMA: ICD-10-CM

## 2023-10-20 DIAGNOSIS — L57.0 AK (ACTINIC KERATOSIS): ICD-10-CM

## 2023-10-20 PROCEDURE — 88305 TISSUE EXAM BY PATHOLOGIST: CPT | Mod: 26,,, | Performed by: PATHOLOGY

## 2023-10-20 PROCEDURE — 88342 PATHOLOGY, DERMATOLOGY: ICD-10-PCS | Mod: 26,,, | Performed by: PATHOLOGY

## 2023-10-20 PROCEDURE — 88305 PATHOLOGY, DERMATOLOGY: ICD-10-PCS | Mod: 26,,, | Performed by: PATHOLOGY

## 2023-10-20 PROCEDURE — 88342 IMHCHEM/IMCYTCHM 1ST ANTB: CPT | Mod: 26,,, | Performed by: PATHOLOGY

## 2023-10-20 PROCEDURE — 17000 PR DESTRUCTION(LASER SURGERY,CRYOSURGERY,CHEMOSURGERY),PREMALIGNANT LESIONS,FIRST LESION: ICD-10-PCS | Mod: XS,,, | Performed by: STUDENT IN AN ORGANIZED HEALTH CARE EDUCATION/TRAINING PROGRAM

## 2023-10-20 PROCEDURE — 11104 PR PUNCH BIOPSY, SKIN, SINGLE LESION: ICD-10-PCS | Mod: ,,, | Performed by: STUDENT IN AN ORGANIZED HEALTH CARE EDUCATION/TRAINING PROGRAM

## 2023-10-20 PROCEDURE — 17000 DESTRUCT PREMALG LESION: CPT | Mod: XS,,, | Performed by: STUDENT IN AN ORGANIZED HEALTH CARE EDUCATION/TRAINING PROGRAM

## 2023-10-20 PROCEDURE — 88305 TISSUE EXAM BY PATHOLOGIST: CPT | Mod: TC,SUR | Performed by: STUDENT IN AN ORGANIZED HEALTH CARE EDUCATION/TRAINING PROGRAM

## 2023-10-20 PROCEDURE — 11104 PUNCH BX SKIN SINGLE LESION: CPT | Mod: ,,, | Performed by: STUDENT IN AN ORGANIZED HEALTH CARE EDUCATION/TRAINING PROGRAM

## 2023-10-20 PROCEDURE — 88341 PATHOLOGY, DERMATOLOGY: ICD-10-PCS | Mod: 26,,, | Performed by: PATHOLOGY

## 2023-10-20 PROCEDURE — 99213 OFFICE O/P EST LOW 20 MIN: CPT | Mod: 25,,, | Performed by: STUDENT IN AN ORGANIZED HEALTH CARE EDUCATION/TRAINING PROGRAM

## 2023-10-20 PROCEDURE — 99213 PR OFFICE/OUTPT VISIT, EST, LEVL III, 20-29 MIN: ICD-10-PCS | Mod: 25,,, | Performed by: STUDENT IN AN ORGANIZED HEALTH CARE EDUCATION/TRAINING PROGRAM

## 2023-10-20 PROCEDURE — 88341 IMHCHEM/IMCYTCHM EA ADD ANTB: CPT | Mod: 26,,, | Performed by: PATHOLOGY

## 2023-10-20 NOTE — PROGRESS NOTES
[facility]  Carroll Chandler MD    [address] [address]  [phone]    Fax: [phone]    Patient Name: Johnny Haynes  Medical Record Number: [id number]  PCP: Jovan Hsu DO  Age: 73 y.o. : 1950  Contact: [phone] (home)     History of Present Illness:     Johnny Haynes is a 73 y.o.  male here for follow up for skin exam given history of melanoma (Exc 2023, Breslow depth 2.82 mm, stage: pT3a. He follows with Dr. Shrestha with clear PET scan in July  Patient is not concerned with anything at this time.     The patient has no other concerns today.    Review of Systems:     Unremarkable other than mentioned above.     Physical Exam:     General: Relaxed, oriented, alert    Skin examination of the scalp, face, neck, chest, back, abdomen, upper extremities and lower extremities were normal except for as listed below      Assessment and Plan:     1. Actinic Keratoses  Erythematous, scaly papules on right ear    Plan: Liquid Nitrogen.  A total of 1 lesions were treated with liquid nitrogen for 2 freeze-thaw cycles lasting 5 seconds, located on the above locations.   The patient's consent was obtained including but not limited to risks of crusting, scabbing,  blistering, scarring, darker or lighter pigmentary change, recurrence, incomplete removal and infection.    Counseling.  Sun protective clothing and broad spectrum sunscreen can prevent the formation of AK.   AKs can be treated with cryotherapy, photodynamic therapy, imiquimod, topical 5-FU.  Actinic Keratoses are precancerous proliferations that occur within sun damaged skin. If untreated,  a small subset of AKs can develop into Squamous Cell Carcinoma.      2. Seborrheic keratoses   - brown stuck on appearing papules/plaques  - patient educated on benign nature. No treatment necessary unless they become irritated or inflamed     3.History of malignant melanoma  - well healed scar with  NER    Plan: Counseling  Patients with a history of melanoma should wear broad spectrum sunscreen and sun protective clothing.  Ecars from excisional sites of melanoma should be monitored for any recurrences. Monthly self-skin checks should be performed to monitor for any moles that change in size, shape or color, itch burn or bleed.  Contact Office if: Patient notices any new or changing moles, develops constitutional symptoms or develops new lesions within or around the previous melanoma scar.    4. Neoplasm of Uncertain Behavior   - Brown/blue macule located on the posterior neck  Ddx includes: Favor blue nevus/deep penetrating nevus over in transit metastasis    Punch Biopsy     Pre-procedure Diagnosis: Favor blue nevus/deep penetrating nevus over in transit metastasis  Post_procedure Diagnosis: same  Estimated Blood Loss: 1cc    Findings: None  Complications: None  Specimens: to pathology    Written informed consent was obtained after discussing risks including pain, bleeding, infection, recurrence and scarring. The biopsy site was sterilely prepped with alcohol, which was allowed to dry completely, then locally infiltrated with 1% lidocaine with epinephrine, ~3 cc total. A punch biopsy was obtained using a 4mm size punch and the specimen was sent to dermatopathology. 1 suture was placed for hemostasis. Petrolatum ointment and a clean dressing were applied. The patient tolerated the procedure well without complications. Verbal and written wound care instructions were given. Sutures should be removed in {7-10 days.        Return to clinic in 4 months for fse    AVS printed with patient instructions     Carroll Chandler MD   Mohs Surgery/Dermatologic Oncology  Dermatology

## 2023-10-20 NOTE — PATIENT INSTRUCTIONS
"Biopsy Site Care  Starting tomorrow you may shower and wash the area with antibacterial soap  Pat dry and apply vaseline and a bandaid  The area will be irritated, sore, slightly red, and may itch, sting, or burn  Do not apply make-up to the area until it is healed  There will be a scar anytime we cut the skin to the level of the dermis, which occurs in a biopsy   The area will take 1-2 weeks to heal  No soaking in the tub or hot tub for one week      Liquid Nitrogen Wound Care     - the areas treated with liquid nitrogen may form sores, blisters, and scabs. This is normal   - if a blister forms, please keep it intact and do not rupture it. It will resolve within a few days   - please keep petrolatum ointment to the area once to twice daily. You can cover with a bandage if you wish, but it is usually not necessary   - the area may be painful for a few days. We recommend ice, or over the counter tylenol or NSAIDs (such as ibuprofen or naproxen, if you are able to take these)   - this may result in a scar or a "hypopigmented" or lighter area of skin. This may or may not resolve with time   - please call our clinic if the lesion does not resolve, as this can be treated again     "

## 2023-10-24 LAB
DHEA SERPL-MCNC: NORMAL
ESTROGEN SERPL-MCNC: NORMAL PG/ML
INSULIN SERPL-ACNC: NORMAL U[IU]/ML
LAB AP GROSS DESCRIPTION: NORMAL
LAB AP LABORATORY NOTES: NORMAL
LAB AP SPEC A DDX: NORMAL
LAB AP SPEC A MORPHOLOGY: NORMAL
LAB AP SPEC A PROCEDURE: NORMAL
T3RU NFR SERPL: NORMAL %

## 2023-10-30 ENCOUNTER — CLINICAL SUPPORT (OUTPATIENT)
Dept: DERMATOLOGY | Facility: CLINIC | Age: 73
End: 2023-10-30
Payer: MEDICARE

## 2023-10-30 DIAGNOSIS — Z48.02 VISIT FOR SUTURE REMOVAL: Primary | ICD-10-CM

## 2023-10-30 NOTE — PROGRESS NOTES
Pt came in today for suture removal of punch biopsy site of posterior neck  Healing well.   Swetha Kraft,CMA

## 2023-12-09 DIAGNOSIS — Z71.89 COMPLEX CARE COORDINATION: ICD-10-CM

## 2024-02-02 ENCOUNTER — OFFICE VISIT (OUTPATIENT)
Dept: FAMILY MEDICINE | Facility: CLINIC | Age: 74
End: 2024-02-02
Payer: MEDICARE

## 2024-02-02 VITALS
HEART RATE: 71 BPM | RESPIRATION RATE: 18 BRPM | TEMPERATURE: 98 F | SYSTOLIC BLOOD PRESSURE: 145 MMHG | WEIGHT: 166.81 LBS | OXYGEN SATURATION: 96 % | DIASTOLIC BLOOD PRESSURE: 77 MMHG | HEIGHT: 65 IN | BODY MASS INDEX: 27.79 KG/M2

## 2024-02-02 DIAGNOSIS — Z12.9 MALIGNANT NEOPLASM SCREEN: ICD-10-CM

## 2024-02-02 DIAGNOSIS — F17.200 TOBACCO DEPENDENCE: Primary | ICD-10-CM

## 2024-02-02 PROCEDURE — 99212 OFFICE O/P EST SF 10 MIN: CPT | Mod: GC,,, | Performed by: SPECIALIST

## 2024-02-05 NOTE — PROGRESS NOTES
Subjective:       Patient ID: Johnny Haynes is a 73 y.o. male.    Chief Complaint: CHECK UP  and Hypertension    Johnny Haynes is a 73. y.o.m. with a PMH of HTN, BPH and Tobacco dependency that presents to the clinic today for check up. Patient reports he came to the doctor because his sister purged him too. Patient's sister also had an appointment with me today. Patient reports he has no complaints. Patient's blood pressure was discussed. Patient also urged to get a colonoscopy but refuses. Patient states he feels fine and just wanted to have a check up. Smoking cessation discussed again but patient continues to smoke and does not wish to quit at this time.          Current Outpatient Medications:     amLODIPine (NORVASC) 10 MG tablet, Take 1 tablet (10 mg total) by mouth once daily., Disp: 90 tablet, Rfl: 6    losartan (COZAAR) 100 MG tablet, Take 1 tablet (100 mg total) by mouth once daily., Disp: 90 tablet, Rfl: 6    tamsulosin (FLOMAX) 0.4 mg Cap, Take 1 capsule (0.4 mg total) by mouth once daily., Disp: 90 capsule, Rfl: 6    traZODone (DESYREL) 50 MG tablet, Take 1 tablet (50 mg total) by mouth every evening., Disp: 30 tablet, Rfl: 11    Review of patient's allergies indicates:  No Known Allergies    Past Medical History:   Diagnosis Date    Hypertension        Past Surgical History:   Procedure Laterality Date    CARDIAC SURGERY      COLONOSCOPY         Family History   Problem Relation Age of Onset    Heart disease Mother     Aneurysm Mother     Heart disease Father     Cancer Father     Diabetes Sister     Eating disorder Brother     Cancer Maternal Aunt     Heart disease Maternal Uncle     Aneurysm Maternal Grandmother     Diabetes Maternal Grandfather     Diabetes Paternal Grandfather        Social History     Tobacco Use    Smoking status: Every Day     Types: Cigarettes    Smokeless tobacco: Never   Substance Use Topics    Alcohol use: Not Currently    Drug use: Never       Review of Systems  "  Constitutional:  Negative for activity change, appetite change, chills and fever.   HENT:  Negative for nasal congestion, hearing loss, nosebleeds and trouble swallowing.    Eyes:  Negative for visual disturbance.   Respiratory:  Negative for cough and shortness of breath.    Cardiovascular:  Negative for chest pain and leg swelling.   Gastrointestinal:  Negative for abdominal pain, blood in stool, change in bowel habit, constipation, diarrhea, nausea and vomiting.   Endocrine: Negative for polyuria.   Genitourinary:  Negative for difficulty urinating, dysuria and frequency.   Musculoskeletal:  Negative for joint swelling and leg pain.   Integumentary:  Negative for rash.   Neurological:  Negative for dizziness, weakness, light-headedness and headaches.   All other systems reviewed and are negative.        Current Medications:   Medication List with Changes/Refills   Current Medications    AMLODIPINE (NORVASC) 10 MG TABLET    Take 1 tablet (10 mg total) by mouth once daily.       Start Date: 10/16/2023End Date: --    LOSARTAN (COZAAR) 100 MG TABLET    Take 1 tablet (100 mg total) by mouth once daily.       Start Date: 10/16/2023End Date: --    TAMSULOSIN (FLOMAX) 0.4 MG CAP    Take 1 capsule (0.4 mg total) by mouth once daily.       Start Date: 10/16/2023End Date: --    TRAZODONE (DESYREL) 50 MG TABLET    Take 1 tablet (50 mg total) by mouth every evening.       Start Date: 10/16/2023End Date: 10/15/2024            Objective:        Vitals:    02/02/24 1339 02/02/24 1341 02/02/24 1342   BP: (!) 160/85 (!) 151/76 (!) 145/77   BP Location: Left arm Right arm Left arm   Patient Position: Sitting Sitting Sitting   BP Method: Large (Automatic) Large (Automatic) Large (Manual)   Pulse: 71     Resp: 18     Temp: 98 °F (36.7 °C)     TempSrc: Oral     SpO2: 96%     Weight: 75.7 kg (166 lb 12.8 oz)     Height: 5' 5" (1.651 m)         Physical Exam  Vitals and nursing note reviewed.   Constitutional:       General: He is not " in acute distress.     Appearance: Normal appearance. He is not ill-appearing, toxic-appearing or diaphoretic.   HENT:      Head: Normocephalic and atraumatic.      Nose: Nose normal. No congestion or rhinorrhea.      Mouth/Throat:      Mouth: Mucous membranes are moist.      Pharynx: Oropharynx is clear. No oropharyngeal exudate or posterior oropharyngeal erythema.   Eyes:      Conjunctiva/sclera: Conjunctivae normal.      Pupils: Pupils are equal, round, and reactive to light.   Cardiovascular:      Rate and Rhythm: Normal rate and regular rhythm.      Pulses: Normal pulses.      Heart sounds: Normal heart sounds. No murmur heard.     No friction rub.   Pulmonary:      Effort: Pulmonary effort is normal. No respiratory distress.      Breath sounds: Normal breath sounds. No wheezing, rhonchi or rales.   Abdominal:      Tenderness: There is no abdominal tenderness. There is no guarding.   Musculoskeletal:      Right lower leg: No edema.      Left lower leg: No edema.   Skin:     Capillary Refill: Capillary refill takes less than 2 seconds.   Neurological:      General: No focal deficit present.      Mental Status: He is alert and oriented to person, place, and time.   Psychiatric:         Mood and Affect: Mood normal.         Behavior: Behavior normal.         Thought Content: Thought content normal.         Judgment: Judgment normal.               Lab Results   Component Value Date    WBC 5.86 08/21/2023    HGB 15.5 08/21/2023    HCT 44.7 08/21/2023     08/21/2023    CHOL 151 04/26/2023    TRIG 133 04/26/2023    HDL 47 04/26/2023    ALT 23 08/21/2023    AST 17 08/21/2023     08/21/2023    K 4.1 08/21/2023     08/21/2023    CREATININE 1.12 08/21/2023    BUN 14 08/21/2023    CO2 26 08/21/2023    PSA 9.890 (H) 10/16/2023    HGBA1C 5.4 08/21/2023    MICROALBUR 2.7 10/16/2023      Assessment:       1. Tobacco dependence    2. Malignant neoplasm screen        Plan:         Problem List Items Addressed  This Visit          Oncology    Malignant neoplasm screen     Patient also urged to get a colonoscopy but refuses.             Other    Tobacco dependence - Primary     Smoking cessation discussed again but patient continues to smoke and does not wish to quit at this time.              Follow up in about 6 months (around 8/2/2024).    Jovan Hsu DO     Instructed patient that if symptoms fail to improve or worsen patient should seek immediate medical attention or report to the nearest emergency department. Patient expressed verbal agreement and understanding to this plan of care.

## 2024-02-20 ENCOUNTER — OFFICE VISIT (OUTPATIENT)
Dept: DERMATOLOGY | Facility: CLINIC | Age: 74
End: 2024-02-20
Payer: MEDICARE

## 2024-02-20 DIAGNOSIS — D22.9 MULTIPLE BENIGN NEVI: ICD-10-CM

## 2024-02-20 DIAGNOSIS — Z85.820 HISTORY OF MELANOMA: Primary | ICD-10-CM

## 2024-02-20 PROCEDURE — 99213 OFFICE O/P EST LOW 20 MIN: CPT | Mod: ,,, | Performed by: STUDENT IN AN ORGANIZED HEALTH CARE EDUCATION/TRAINING PROGRAM

## 2024-02-20 NOTE — PROGRESS NOTES
[facility]  Carroll Chandler MD    [address] [place] [address]  [phone]    Fax: [phone]    Patient Name: Johnny Haynes  Medical Record Number: [id number]  PCP: Jovan Hsu DO  Age: 73 y.o. : 1950  Contact: [phone] (home)     History of Present Illness:     Johnny Haynes is a 73 y.o.  male here for follow up for history of melanoma (upper mid back excised 2023, Breslow depth 2.82 mm, stage: pT3a). He follows with Dr. Shrestha with clear PET scan in July. Patient is not concerned with anything at this time.      The patient has no other concerns today.    Review of Systems:     Unremarkable other than mentioned above.     Physical Exam:     General: Relaxed, oriented, alert    Skin examination of the scalp, face, neck, chest, back, abdomen, upper extremities and lower extremities were normal except for as listed below      Assessment and Plan:     1. History of malignant melanoma  - well healed scar with NER   - continue to follow with Dr. Shrestha     Plan: Counseling  Patients with a history of melanoma should wear broad spectrum sunscreen and sun protective clothing.  Ecars from excisional sites of melanoma should be monitored for any recurrences. Monthly self-skin checks should be performed to monitor for any moles that change in size, shape or color, itch burn or bleed.  Contact Office if: Patient notices any new or changing moles, develops constitutional symptoms or develops new lesions within or around the previous melanoma scar.    2. Benign appearing melanocytic nevi   - no lesions appear clinically or dermatoscopically atypical enough to warrant biopsy at this time  - The patient was counseled on the ABCDE's of melanoma and on the importance of performing monthly self skin checks at home in between visits and will call our clinic with changes.  - discussed importance of sunscreen SPF 30 or greater     Return to clinic in 6 months.    DOMINICK  printed with patient instructions     Carroll Chandler MD   Mohs Surgery/Dermatologic Oncology  Dermatology

## 2024-07-26 NOTE — ASSESSMENT & PLAN NOTE
Patient also urged to get a colonoscopy but refuses.   
Smoking cessation discussed again but patient continues to smoke and does not wish to quit at this time.  
1/18/2021    IR FLUOROSCOPY GUIDED CENTRAL VENOUS ACCESS DEVICE PLACEMENT  12/13/2021    IR NONTUNNELED VASCULAR CATHETER  10/18/2021    IR NONTUNNELED VASCULAR CATHETER 10/18/2021 [facility]    IR NONTUNNELED VASCULAR CATHETER  01/18/2021    IR NONTUNNELED VASCULAR CATHETER 1/18/2021 [facility]    IR NONTUNNELED VASCULAR CATHETER  10/18/2021    IR NONTUNNELED VASCULAR CATHETER  1/18/2021    IR NONTUNNELED VASCULAR CATHETER  01/03/2024    IR NONTUNNELED VASCULAR CATHETER 1/3/2024 Farida Leon PA-C [facility]    IR REMOVAL TUNNELED CVC WO PORT PUMP  10/18/2021    IR TUNNELED CATHETER PLACEMENT GREATER THAN 5 YEARS  01/22/2021    IR TUNNELED CATHETER PLACEMENT GREATER THAN 5 YEARS 1/22/2021 [facility]    IR TUNNELED CATHETER PLACEMENT GREATER THAN 5 YEARS  12/13/2021    IR TUNNELED CATHETER PLACEMENT GREATER THAN 5 YEARS 12/13/2021 [facility]    IR TUNNELED CATHETER PLACEMENT GREATER THAN 5 YEARS  1/22/2021    IR TUNNELED CATHETER PLACEMENT GREATER THAN 5 YEARS  12/13/2021    LEG AMPUTATION BELOW KNEE Right 06/19/2023    LEG AMPUTATION BELOW KNEE ON RIGHT performed by Papo Charlton MD at [facility] MAIN OR    LEG AMPUTATION BELOW KNEE Right     TOE AMPUTATION Right 05/11/2023    AMPUTATION THIRD TOE RIGHT performed by Catarino Wisdom DPM at [facility] MAIN OR     Family History   Problem Relation Age of Onset    Heart Failure Father     Diabetes Mother      Social History     Socioeconomic History    Marital status:      Spouse name: None    Number of children: None    Years of education: None    Highest education level: None   Tobacco Use    Smoking status: Never    Smokeless tobacco: Never   Vaping Use    Vaping Use: Never used   Substance and Sexual Activity    Alcohol use: Not Currently    Drug use: Never     Social Determinants of Health     Food Insecurity: No Food Insecurity (2/8/2024)    Hunger Vital Sign     Worried About Running Out of Food in the Last Year: Never true     Ran Out of

## 2024-11-19 ENCOUNTER — OFFICE VISIT (OUTPATIENT)
Dept: FAMILY MEDICINE | Facility: CLINIC | Age: 74
End: 2024-11-19
Payer: MEDICARE

## 2024-11-19 VITALS
WEIGHT: 157 LBS | HEIGHT: 65 IN | SYSTOLIC BLOOD PRESSURE: 139 MMHG | BODY MASS INDEX: 26.16 KG/M2 | DIASTOLIC BLOOD PRESSURE: 73 MMHG | TEMPERATURE: 98 F | HEART RATE: 62 BPM | OXYGEN SATURATION: 99 % | RESPIRATION RATE: 16 BRPM

## 2024-11-19 DIAGNOSIS — F17.200 SMOKING: ICD-10-CM

## 2024-11-19 DIAGNOSIS — Z12.2 SCREENING FOR LUNG CANCER: ICD-10-CM

## 2024-11-19 DIAGNOSIS — G47.00 INSOMNIA, UNSPECIFIED TYPE: ICD-10-CM

## 2024-11-19 DIAGNOSIS — F17.210 CIGARETTE NICOTINE DEPENDENCE WITHOUT COMPLICATION: ICD-10-CM

## 2024-11-19 DIAGNOSIS — F17.200 TOBACCO DEPENDENCE: ICD-10-CM

## 2024-11-19 DIAGNOSIS — Z13.220 LIPID SCREENING: ICD-10-CM

## 2024-11-19 DIAGNOSIS — R97.20 ELEVATED PSA: ICD-10-CM

## 2024-11-19 DIAGNOSIS — N40.0 BENIGN PROSTATIC HYPERPLASIA, UNSPECIFIED WHETHER LOWER URINARY TRACT SYMPTOMS PRESENT: ICD-10-CM

## 2024-11-19 DIAGNOSIS — I10 ESSENTIAL (PRIMARY) HYPERTENSION: ICD-10-CM

## 2024-11-19 DIAGNOSIS — I10 PRIMARY HYPERTENSION: Primary | ICD-10-CM

## 2024-11-19 LAB
ALBUMIN SERPL BCP-MCNC: 4 G/DL (ref 3.4–4.8)
ALBUMIN/GLOB SERPL: 1.2 {RATIO}
ALP SERPL-CCNC: 123 U/L (ref 40–150)
ALT SERPL W P-5'-P-CCNC: 14 U/L (ref 0–55)
ANION GAP SERPL CALCULATED.3IONS-SCNC: 13 MMOL/L (ref 7–16)
AST SERPL W P-5'-P-CCNC: 19 U/L (ref 5–34)
BASOPHILS # BLD AUTO: 0.03 K/UL (ref 0–0.2)
BASOPHILS NFR BLD AUTO: 0.5 % (ref 0–1)
BILIRUB SERPL-MCNC: 0.6 MG/DL
BUN SERPL-MCNC: 11 MG/DL (ref 8–26)
BUN/CREAT SERPL: 10 (ref 6–20)
CALCIUM SERPL-MCNC: 8.9 MG/DL (ref 8.8–10)
CHLORIDE SERPL-SCNC: 105 MMOL/L (ref 98–107)
CHOLEST SERPL-MCNC: 148 MG/DL
CHOLEST/HDLC SERPL: 4.4 {RATIO}
CO2 SERPL-SCNC: 24 MMOL/L (ref 23–31)
CREAT SERPL-MCNC: 1.09 MG/DL (ref 0.72–1.25)
DIFFERENTIAL METHOD BLD: ABNORMAL
EGFR (NO RACE VARIABLE) (RUSH/TITUS): 71 ML/MIN/1.73M2
EOSINOPHIL # BLD AUTO: 0.01 K/UL (ref 0–0.5)
EOSINOPHIL NFR BLD AUTO: 0.2 % (ref 1–4)
ERYTHROCYTE [DISTWIDTH] IN BLOOD BY AUTOMATED COUNT: 14 % (ref 11.5–14.5)
GLOBULIN SER-MCNC: 3.4 G/DL (ref 2–4)
GLUCOSE SERPL-MCNC: 103 MG/DL (ref 82–115)
HCT VFR BLD AUTO: 50.4 % (ref 40–54)
HDLC SERPL-MCNC: 34 MG/DL (ref 35–60)
HGB BLD-MCNC: 16.6 G/DL (ref 13.5–18)
IMM GRANULOCYTES # BLD AUTO: 0.04 K/UL (ref 0–0.04)
IMM GRANULOCYTES NFR BLD: 0.7 % (ref 0–0.4)
LDLC SERPL CALC-MCNC: 83 MG/DL
LDLC/HDLC SERPL: 2.4 {RATIO}
LYMPHOCYTES # BLD AUTO: 1.45 K/UL (ref 1–4.8)
LYMPHOCYTES NFR BLD AUTO: 26.1 % (ref 27–41)
MCH RBC QN AUTO: 31.2 PG (ref 27–31)
MCHC RBC AUTO-ENTMCNC: 32.9 G/DL (ref 32–36)
MCV RBC AUTO: 94.7 FL (ref 80–96)
MONOCYTES # BLD AUTO: 0.61 K/UL (ref 0–0.8)
MONOCYTES NFR BLD AUTO: 11 % (ref 2–6)
MPC BLD CALC-MCNC: 9.4 FL (ref 9.4–12.4)
NEUTROPHILS # BLD AUTO: 3.41 K/UL (ref 1.8–7.7)
NEUTROPHILS NFR BLD AUTO: 61.5 % (ref 53–65)
NONHDLC SERPL-MCNC: 114 MG/DL
NRBC # BLD AUTO: 0 X10E3/UL
NRBC, AUTO (.00): 0 %
PLATELET # BLD AUTO: 258 K/UL (ref 150–400)
POTASSIUM SERPL-SCNC: 4.1 MMOL/L (ref 3.5–5.1)
PROT SERPL-MCNC: 7.4 G/DL (ref 5.8–7.6)
RBC # BLD AUTO: 5.32 M/UL (ref 4.6–6.2)
SODIUM SERPL-SCNC: 138 MMOL/L (ref 136–145)
TRIGL SERPL-MCNC: 155 MG/DL (ref 34–140)
VLDLC SERPL-MCNC: 31 MG/DL
WBC # BLD AUTO: 5.55 K/UL (ref 4.5–11)

## 2024-11-19 PROCEDURE — 99214 OFFICE O/P EST MOD 30 MIN: CPT | Mod: GC,,, | Performed by: FAMILY MEDICINE

## 2024-11-19 PROCEDURE — 80053 COMPREHEN METABOLIC PANEL: CPT | Mod: ,,, | Performed by: CLINICAL MEDICAL LABORATORY

## 2024-11-19 PROCEDURE — 80061 LIPID PANEL: CPT | Mod: ,,, | Performed by: CLINICAL MEDICAL LABORATORY

## 2024-11-19 PROCEDURE — 85025 COMPLETE CBC W/AUTO DIFF WBC: CPT | Mod: ,,, | Performed by: CLINICAL MEDICAL LABORATORY

## 2024-11-19 RX ORDER — AMLODIPINE BESYLATE 10 MG/1
10 TABLET ORAL DAILY
Qty: 90 TABLET | Refills: 3 | Status: SHIPPED | OUTPATIENT
Start: 2024-11-19

## 2024-11-19 RX ORDER — TAMSULOSIN HYDROCHLORIDE 0.4 MG/1
0.4 CAPSULE ORAL DAILY
Qty: 90 CAPSULE | Refills: 3 | Status: SHIPPED | OUTPATIENT
Start: 2024-11-19

## 2024-11-19 RX ORDER — LOSARTAN POTASSIUM 100 MG/1
100 TABLET ORAL DAILY
Qty: 90 TABLET | Refills: 3 | Status: SHIPPED | OUTPATIENT
Start: 2024-11-19

## 2024-11-19 RX ORDER — TRAZODONE HYDROCHLORIDE 50 MG/1
50 TABLET ORAL NIGHTLY
Qty: 90 TABLET | Refills: 3 | Status: SHIPPED | OUTPATIENT
Start: 2024-11-19 | End: 2025-11-19

## 2024-11-19 NOTE — ASSESSMENT & PLAN NOTE
Chronic condition that is well controlled. /73 today.  Continue amlodipine 10mg QD and losartan 100mg QD.   Recommend log BP daily and bring records next visit.

## 2024-11-19 NOTE — ASSESSMENT & PLAN NOTE
"SMOKING CESSATION      Set a quit date four weeks from start of the above.    The evening before your quit date, throw away your cigarettes, ashtrays, lighters, and anything else that has to do with smoking. The next morning you will wake up a nonsmoker.    Do the following:     Take these steps prior to quitting:  Buy cigarettes by the single pack only    Wait 10 minutes to smoke after an urge to do so    Don't smoke in the car    Smoke your least liked brand    Gradually cut down to a pack per day if you smoke more than that    Make a list of the times, places, and situations in which the urge to smoke is the greatest and then start avoiding these as much as possible    Think about quitting one day at a time, not for the rest of your life     Take these steps after quitting:   Avoid coffee which is often a trigger to smoke a cigarette. Try drinking water or another noncaffeinated beverage instead.    Avoid the times, places, and situations in which the urge to smoke is greatest, or make plans about how to deal with them so you won't smoke if they cannot be avoided.    Play a relaxation tape from time to time if you tended to smoke when anxious. You can buy one online.    If you are using a patch, rub it gently and say "This is all the nicotine I need" when the urge to smoke occurs    If you are using another method, take five deep breaths and exhale slowly when the urge to smoke occurs    Play with a pen to occupy the hand that usually has a cigarette in it    Suck air through a cut-off soda straw (cut to the length of a cigarette) when the urge to smoke is particularly strong    Call 1-800-QUIT-NOW (1-707.630.4508) for support when needed.   Quit Smoking Today - [facility] ([place].gov)     "

## 2024-11-19 NOTE — ASSESSMENT & PLAN NOTE
PSA 9.8 one year ago. Patient was referred but did not see urology yet.  Will refer to urology at this time.

## 2024-11-19 NOTE — PROGRESS NOTES
Subjective:       Patient ID: Johnny Haynes is a 74 y.o. male.    Chief Complaint: Medication Refill    Patient is a 75 y/o male with PMH of HTN, BPH and nicotine dependency who presents to the clinic for HTN follow up and medication refill. Patient reports doing well and has no complaints today. Last year PSA was 9.8. He was referred to urology but has not seen them yet. Patient reports that he had a colonoscopy about 5 years ago. He will sign paperwork to obtain records today. Patient has history of smoking over 50 pack-year. Patient denies fever, chills, nausea, vomiting, SOB, chest pain, bowel or urinary habit changes.           Current Outpatient Medications:     amLODIPine (NORVASC) 10 MG tablet, Take 1 tablet (10 mg total) by mouth once daily., Disp: 90 tablet, Rfl: 3    losartan (COZAAR) 100 MG tablet, Take 1 tablet (100 mg total) by mouth once daily., Disp: 90 tablet, Rfl: 3    tamsulosin (FLOMAX) 0.4 mg Cap, Take 1 capsule (0.4 mg total) by mouth once daily., Disp: 90 capsule, Rfl: 3    traZODone (DESYREL) 50 MG tablet, Take 1 tablet (50 mg total) by mouth every evening., Disp: 90 tablet, Rfl: 3    Review of patient's allergies indicates:  No Known Allergies    Past Medical History:   Diagnosis Date    Hypertension        Past Surgical History:   Procedure Laterality Date    CARDIAC SURGERY      COLONOSCOPY         Family History   Problem Relation Name Age of Onset    Heart disease Mother      Aneurysm Mother      Heart disease Father      Cancer Father      Diabetes Sister      Eating disorder Brother      Cancer Maternal Aunt      Heart disease Maternal Uncle      Aneurysm Maternal Grandmother      Diabetes Maternal Grandfather      Diabetes Paternal Grandfather         Social History     Tobacco Use    Smoking status: Every Day     Types: Cigarettes    Smokeless tobacco: Never   Substance Use Topics    Alcohol use: Not Currently    Drug use: Never       Review of Systems   Constitutional:  Negative  for activity change, appetite change, chills, fatigue and fever.   HENT:  Negative for nasal congestion, ear discharge, ear pain, hearing loss, mouth sores, rhinorrhea, sinus pressure/congestion, sneezing, sore throat and trouble swallowing.    Eyes:  Negative for pain, discharge, redness, itching and visual disturbance.   Respiratory:  Negative for cough, shortness of breath, wheezing and stridor.    Cardiovascular:  Negative for chest pain, palpitations and leg swelling.   Gastrointestinal:  Negative for abdominal distention, abdominal pain, blood in stool, change in bowel habit, constipation, diarrhea, nausea and vomiting.   Endocrine: Negative for polydipsia, polyphagia and polyuria.   Genitourinary:  Negative for decreased urine volume, difficulty urinating, dysuria, flank pain, frequency, hematuria and urgency.   Musculoskeletal:  Negative for arthralgias, leg pain, myalgias, neck pain and neck stiffness.   Integumentary:  Negative for color change, pallor, rash and wound.   Neurological:  Negative for dizziness, vertigo, tremors, seizures, speech difficulty, weakness, numbness and headaches.   Psychiatric/Behavioral:  Negative for behavioral problems, confusion, decreased concentration and sleep disturbance. The patient is not nervous/anxious.          Current Medications:   Medication List with Changes/Refills   Changed and/or Refilled Medications    Modified Medication Previous Medication    AMLODIPINE (NORVASC) 10 MG TABLET amLODIPine (NORVASC) 10 MG tablet       Take 1 tablet (10 mg total) by mouth once daily.    Take 1 tablet (10 mg total) by mouth once daily.       Start Date: 11/19/2024End Date: --    Start Date: 10/16/2023End Date: 11/19/2024    LOSARTAN (COZAAR) 100 MG TABLET losartan (COZAAR) 100 MG tablet       Take 1 tablet (100 mg total) by mouth once daily.    Take 1 tablet (100 mg total) by mouth once daily.       Start Date: 11/19/2024End Date: --    Start Date: 10/16/2023End Date: 11/19/2024  "   TAMSULOSIN (FLOMAX) 0.4 MG CAP tamsulosin (FLOMAX) 0.4 mg Cap       Take 1 capsule (0.4 mg total) by mouth once daily.    Take 1 capsule (0.4 mg total) by mouth once daily.       Start Date: 11/19/2024End Date: --    Start Date: 10/16/2023End Date: 11/19/2024    TRAZODONE (DESYREL) 50 MG TABLET traZODone (DESYREL) 50 MG tablet       Take 1 tablet (50 mg total) by mouth every evening.    Take 1 tablet (50 mg total) by mouth every evening.       Start Date: 11/19/2024End Date: 11/19/2025    Start Date: 10/16/2023End Date: 11/19/2024            Objective:        Vitals:    11/19/24 1324   BP: 139/73   BP Location: Left arm   Patient Position: Sitting   Pulse: 62   Resp: 16   Temp: 97.8 °F (36.6 °C)   TempSrc: Oral   SpO2: 99%   Weight: 71.2 kg (157 lb)   Height: 5' 5" (1.651 m)       Physical Exam  Vitals and nursing note reviewed.   Constitutional:       General: He is not in acute distress.     Appearance: Normal appearance. He is normal weight. He is not ill-appearing, toxic-appearing or diaphoretic.   HENT:      Head: Normocephalic and atraumatic.      Right Ear: External ear normal.      Left Ear: External ear normal.      Nose: Nose normal. No congestion or rhinorrhea.      Mouth/Throat:      Mouth: Mucous membranes are moist.      Pharynx: Oropharynx is clear. No oropharyngeal exudate or posterior oropharyngeal erythema.   Eyes:      General: No scleral icterus.     Extraocular Movements: Extraocular movements intact.      Conjunctiva/sclera: Conjunctivae normal.      Pupils: Pupils are equal, round, and reactive to light.   Cardiovascular:      Rate and Rhythm: Normal rate and regular rhythm.      Pulses: Normal pulses.      Heart sounds: Normal heart sounds. No murmur heard.  Pulmonary:      Effort: Pulmonary effort is normal. No respiratory distress.      Breath sounds: No wheezing, rhonchi or rales.   Abdominal:      General: Abdomen is flat. Bowel sounds are normal. There is no distension.      " Palpations: Abdomen is soft.      Tenderness: There is no abdominal tenderness. There is no right CVA tenderness, left CVA tenderness, guarding or rebound.   Musculoskeletal:         General: No swelling. Normal range of motion.      Cervical back: Neck supple. No rigidity or tenderness.      Right lower leg: No edema.      Left lower leg: No edema.   Skin:     General: Skin is warm and dry.      Capillary Refill: Capillary refill takes less than 2 seconds.      Coloration: Skin is not jaundiced.      Findings: No lesion or rash.   Neurological:      General: No focal deficit present.      Mental Status: He is alert and oriented to person, place, and time.      Cranial Nerves: No cranial nerve deficit.      Sensory: No sensory deficit.      Motor: No weakness.   Psychiatric:         Mood and Affect: Mood normal.         Behavior: Behavior normal.         Thought Content: Thought content normal.               Lab Results   Component Value Date    WBC 5.55 11/19/2024    HGB 16.6 11/19/2024    HCT 50.4 11/19/2024     11/19/2024    CHOL 148 11/19/2024    TRIG 155 (H) 11/19/2024    HDL 34 (L) 11/19/2024    ALT 14 11/19/2024    AST 19 11/19/2024     11/19/2024    K 4.1 11/19/2024     11/19/2024    CREATININE 1.09 11/19/2024    BUN 11 11/19/2024    CO2 24 11/19/2024    PSA 9.890 (H) 10/16/2023    HGBA1C 5.4 08/21/2023    MICROALBUR 2.7 10/16/2023      Assessment:       1. Primary hypertension    2. Insomnia, unspecified type    3. Benign prostatic hyperplasia, unspecified whether lower urinary tract symptoms present    4. Tobacco dependence    5. Smoking    6. Lipid screening    7. Essential (primary) hypertension    8. Elevated PSA    9. Screening for lung cancer    10. Cigarette nicotine dependence without complication        Plan:         Problem List Items Addressed This Visit          Cardiac/Vascular    Primary hypertension - Primary     Chronic condition that is well controlled. /73  today.  Continue amlodipine 10mg QD and losartan 100mg QD.   Recommend log BP daily and bring records next visit.         Relevant Medications    losartan (COZAAR) 100 MG tablet    amLODIPine (NORVASC) 10 MG tablet    Other Relevant Orders    CBC Auto Differential (Completed)    Comprehensive Metabolic Panel (Completed)       Renal/    Benign prostatic hyperplasia     Patient takes flomax QD.         Relevant Medications    tamsulosin (FLOMAX) 0.4 mg Cap    Elevated PSA     PSA 9.8 one year ago. Patient was referred but did not see urology yet.  Will refer to urology at this time.          Relevant Orders    Ambulatory referral/consult to Urology       Other    Insomnia     Continue trazodone 50mg QHS.         Relevant Medications    traZODone (DESYREL) 50 MG tablet    Tobacco dependence     SMOKING CESSATION      Set a quit date four weeks from start of the above.    The evening before your quit date, throw away your cigarettes, ashtrays, lighters, and anything else that has to do with smoking. The next morning you will wake up a nonsmoker.    Do the following:     Take these steps prior to quitting:  Buy cigarettes by the single pack only    Wait 10 minutes to smoke after an urge to do so    Don't smoke in the car    Smoke your least liked brand    Gradually cut down to a pack per day if you smoke more than that    Make a list of the times, places, and situations in which the urge to smoke is the greatest and then start avoiding these as much as possible    Think about quitting one day at a time, not for the rest of your life     Take these steps after quitting:   Avoid coffee which is often a trigger to smoke a cigarette. Try drinking water or another noncaffeinated beverage instead.    Avoid the times, places, and situations in which the urge to smoke is greatest, or make plans about how to deal with them so you won't smoke if they cannot be avoided.    Play a relaxation tape from time to time if you tended to  "smoke when anxious. You can buy one online.    If you are using a patch, rub it gently and say "This is all the nicotine I need" when the urge to smoke occurs    If you are using another method, take five deep breaths and exhale slowly when the urge to smoke occurs    Play with a pen to occupy the hand that usually has a cigarette in it    Suck air through a cut-off soda straw (cut to the length of a cigarette) when the urge to smoke is particularly strong    Call 8-695-QUIT-NOW (1-451.540.5469) for support when needed.   Quit Smoking Today - [place] Department [facility] [employer] ([place].gov)             Other Visit Diagnoses       Smoking        Lipid screening        Relevant Orders    Lipid Panel (Completed)    Essential (primary) hypertension        Relevant Orders    Lipid Panel (Completed)    Screening for lung cancer        Cigarette nicotine dependence without complication        Relevant Orders    CT Chest Lung Screening Low Dose              Follow up in about 3 months (around 2/19/2025).    Miles Ortega MD     Instructed patient that if symptoms fail to improve or worsen patient should seek immediate medical attention or report to the nearest emergency department. Patient expressed verbal agreement and understanding to this plan of care.     "

## 2025-01-07 ENCOUNTER — OFFICE VISIT (OUTPATIENT)
Dept: DERMATOLOGY | Facility: CLINIC | Age: 75
End: 2025-01-07
Payer: MEDICARE

## 2025-01-07 DIAGNOSIS — L57.0 ACTINIC KERATOSES: ICD-10-CM

## 2025-01-07 DIAGNOSIS — D48.9 NEOPLASM OF UNCERTAIN BEHAVIOR: Primary | ICD-10-CM

## 2025-01-07 DIAGNOSIS — Z85.820 HISTORY OF MALIGNANT MELANOMA: ICD-10-CM

## 2025-01-07 PROCEDURE — 88305 TISSUE EXAM BY PATHOLOGIST: CPT | Mod: TC,SUR | Performed by: STUDENT IN AN ORGANIZED HEALTH CARE EDUCATION/TRAINING PROGRAM

## 2025-01-07 PROCEDURE — 88305 TISSUE EXAM BY PATHOLOGIST: CPT | Mod: 26,,, | Performed by: PATHOLOGY

## 2025-01-07 NOTE — PROGRESS NOTES
[facility]  Carroll Chandler MD    [address] [place] [address]  [phone]    Fax: [phone]    Patient Name: Johnny Haynes  Medical Record Number: [id number]  PCP: Miles Ortega MD  Age: 74 y.o. : 1950  Contact: [phone] (home)     History of Present Illness:     Johnny Haynes is a 74 y.o.  male here for follow up for history of melanoma (upper mid back excised 2023, Breslow depth 2.82 mm, stage: pT3a). He follows with Dr. Shrestha with clear PET scan in July. Patient is not concerned with anything at this time.      The patient has no other concerns today.    Review of Systems:     Unremarkable other than mentioned above.     Physical Exam:     General: Relaxed, oriented, alert    Skin examination of the scalp, face, neck, chest, back, abdomen, upper extremities and lower extremities were normal except for as listed below      Assessment and Plan:     1. History of malignant melanoma  - well healed scar with NER   - continue to follow with Dr. Shrestha     Plan: Counseling  Patients with a history of melanoma should wear broad spectrum sunscreen and sun protective clothing.  Ecars from excisional sites of melanoma should be monitored for any recurrences. Monthly self-skin checks should be performed to monitor for any moles that change in size, shape or color, itch burn or bleed.  Contact Office if: Patient notices any new or changing moles, develops constitutional symptoms or develops new lesions within or around the previous melanoma scar.    2. Neoplasm of Uncertain Behavior   - irregular brown papule located on the right jowl (0.8 cm)   Ddx includes: nevus r/o atypia    Shave removal    Pre-procedure Diagnosis: as above  Post_procedure Diagnosis: same  Estimated Blood Loss: <1cc    Findings: None  Complications: None  Specimens: to pathology      Written informed consent was obtained after discussing risks including pain, bleeding, infection,  recurrence and scarring. The biopsy site was sterilely prepped with alcohol, which was allowed to dry completely, then locally infiltrated with 1% lidocaine with epinephrine, ~3 cc total. A shave removal was obtained using a Dermablade/15 and the specimen was sent to dermatopathology. Aluminum chloride was used for hemostasis. Antibiotic ointment and a clean dressing were applied. The patient tolerated the procedure well without complications. Verbal and written wound care instructions were given.     3. Actinic Keratoses  Erythematous, scaly papules on left forearm     Plan: Liquid Nitrogen.  A total of 1 lesions were treated with liquid nitrogen for 2 freeze-thaw cycles lasting 5 seconds, located on the above locations.   The patient's consent was obtained including but not limited to risks of crusting, scabbing,  blistering, scarring, darker or lighter pigmentary change, recurrence, incomplete removal and infection.    Counseling.  Sun protective clothing and broad spectrum sunscreen can prevent the formation of AK.   AKs can be treated with cryotherapy, photodynamic therapy, imiquimod, topical 5-FU.  Actinic Keratoses are precancerous proliferations that occur within sun damaged skin. If untreated,  a small subset of AKs can develop into Squamous Cell Carcinoma.          Return to clinic in 6 months.    AVS printed with patient instructions     Carroll Chandler MD   Mohs Surgery/Dermatologic Oncology  Dermatology

## 2025-01-09 LAB
ESTROGEN SERPL-MCNC: NORMAL PG/ML
INSULIN SERPL-ACNC: NORMAL U[IU]/ML
LAB AP GROSS DESCRIPTION: NORMAL
LAB AP LABORATORY NOTES: NORMAL
LAB AP SPEC A DDX: NORMAL
LAB AP SPEC A MORPHOLOGY: NORMAL
LAB AP SPEC A PROCEDURE: NORMAL
T3RU NFR SERPL: NORMAL %

## 2025-02-19 ENCOUNTER — OFFICE VISIT (OUTPATIENT)
Dept: UROLOGY | Facility: CLINIC | Age: 75
End: 2025-02-19
Payer: MEDICARE

## 2025-02-19 VITALS
DIASTOLIC BLOOD PRESSURE: 67 MMHG | OXYGEN SATURATION: 94 % | HEART RATE: 74 BPM | HEIGHT: 65 IN | WEIGHT: 157 LBS | BODY MASS INDEX: 26.16 KG/M2 | SYSTOLIC BLOOD PRESSURE: 147 MMHG

## 2025-02-19 DIAGNOSIS — R97.20 ELEVATED PSA: Primary | ICD-10-CM

## 2025-02-19 DIAGNOSIS — R39.14 BENIGN PROSTATIC HYPERPLASIA WITH INCOMPLETE BLADDER EMPTYING: Chronic | ICD-10-CM

## 2025-02-19 DIAGNOSIS — N40.1 BENIGN PROSTATIC HYPERPLASIA WITH INCOMPLETE BLADDER EMPTYING: Chronic | ICD-10-CM

## 2025-02-19 DIAGNOSIS — R35.0 FREQUENCY OF URINATION: ICD-10-CM

## 2025-02-19 DIAGNOSIS — R35.1 NOCTURIA: ICD-10-CM

## 2025-02-19 DIAGNOSIS — R39.15 URGENCY OF URINATION: ICD-10-CM

## 2025-02-19 PROBLEM — N40.0 BENIGN PROSTATIC HYPERPLASIA: Chronic | Status: ACTIVE | Noted: 2023-05-02

## 2025-02-19 PROCEDURE — 99214 OFFICE O/P EST MOD 30 MIN: CPT | Mod: PBBFAC | Performed by: NURSE PRACTITIONER

## 2025-02-19 NOTE — PATIENT INSTRUCTIONS
1. Urine culture  2. PSA today  3. Continue tamsulosin (Flomax) 0.4 mg 1 capsule in the evening  4. Follow-up with urology NP AMY Ramsay in 2 weeks for PSA results and recommendations

## 2025-02-19 NOTE — PROGRESS NOTES
Subjective     Patient ID: Johnny Haynes is a 74 y.o. male.    Chief Complaint:  Initial visit for elevated PSA    This pleasant 74 year old male presents to the clinic as a new patient referral from DO FIDE Salazar for elevated PSA. Patient states he is doing good today.  His PSA was elevated at 9.890 on October 16, 2023.  He does not know of any other elevated PSAs and this is the only PSA in the EMR.  He denies any family history of prostate cancer.  I discussed the risk of prostate cancer with the patient. I discussed that the average risk male has a 1 in 6 chance of developing prostate cancer and the high-risk male has a 1 in 3 chance of developing prostate cancer.  I discussed that the  Male and any male with a first degree relative such as a father or brother are in the high-risk group. I discussed we could treat with a month of antibiotics and repeat the PSA in 3 months versus MRI of the prostate with possible prostate fusion biopsies versus prostate biopsies in the clinic.  He desires to have the PSA rechecked today and we will consider doing the antibiotics with a repeat PSA in 3 months if still elevated.  Patient is on Flomax 0.4 mg 1 capsule every evening.  He states he is tolerating this medicine without side effects.  He is not pleased with the way he is voiding.  His IPSS score is 20 that reflects incomplete bladder emptying, frequency, intermittency, urgency, weak stream, and nocturia 4 times a night.  His PVR is 055 MLS.  He denies dysuria, hematuria, or straining to urinate.  He denies fever, chills, nausea, or vomiting.  He denies any  pains.  I discussed decreasing bladder irritants such as caffeine and carbonated beverages.  I also discussed stopping fluid intake 2 hours before bedtime.  He is a smoker and denies any alcohol.  I discussed increasing the Flomax versus adding finasteride.  I also discussed doing the BPH workup with imaging and cystoscopy.  Through shared  decision-making with the patient, he desires to repeat the PSA today since his last PSA was over a year ago.  He desires to continue the Flomax 0.4 mg 1 capsule at bedtime for now.  He desires to have his urine cultured and treated if indicated.  We will see the patient back in the clinic in 2 weeks for PSA results with recommendations and further discussed the BPH.  He was encouraged again to decrease the bladder irritants and stop fluid intake 2 hours before bedtime.  I discussed the plan in detail with the patient and he is in agreement with the plan.  All his questions were answered at today's visit.  I spent 45 minutes counseling this patient, reviewing the chart, imaging and labs.  ----------------------------------------------------------------------------------------  [February 19, 2025].       Review of Systems   Constitutional:  Negative for activity change and fever.   HENT:  Negative for hearing loss and trouble swallowing.    Eyes:  Negative for visual disturbance.   Respiratory:  Negative for cough, shortness of breath and wheezing.    Cardiovascular:  Negative for chest pain.   Gastrointestinal:  Negative for abdominal pain, diarrhea, nausea and vomiting.   Endocrine: Negative for polyuria.   Genitourinary:  Positive for frequency and urgency. Negative for bladder incontinence, decreased urine volume, difficulty urinating, discharge, dysuria, enuresis, erectile dysfunction, flank pain, genital sores, hematuria, penile pain, penile swelling, scrotal swelling and testicular pain.        Elevated PSA       BPH with LUTS       Nocturia   Musculoskeletal:  Negative for back pain and gait problem.   Integumentary:  Negative for rash.   Neurological:  Negative for speech difficulty and weakness.   Psychiatric/Behavioral:  Negative for behavioral problems and confusion.           Objective     Physical Exam  Vitals and nursing note reviewed.   Constitutional:       General: He is not in acute distress.      Appearance: Normal appearance. He is not ill-appearing, toxic-appearing or diaphoretic.   HENT:      Head: Normocephalic.   Eyes:      Extraocular Movements: Extraocular movements intact.   Cardiovascular:      Rate and Rhythm: Normal rate and regular rhythm.      Heart sounds: Normal heart sounds.   Pulmonary:      Effort: Pulmonary effort is normal. No respiratory distress.      Breath sounds: Normal breath sounds. No wheezing, rhonchi or rales.   Abdominal:      General: Bowel sounds are normal.      Palpations: Abdomen is soft.      Tenderness: There is no abdominal tenderness. There is no right CVA tenderness, left CVA tenderness, guarding or rebound.   Genitourinary:     Rectum: Normal.      Comments: DREAD greater than 25 g, smooth, symmetrical, nontender, non indurated  Musculoskeletal:         General: Normal range of motion.      Cervical back: Normal range of motion. No rigidity.   Skin:     General: Skin is warm and dry.   Neurological:      General: No focal deficit present.      Mental Status: He is alert and oriented to person, place, and time.      Motor: No weakness.      Coordination: Coordination normal.      Gait: Gait normal.   Psychiatric:         Mood and Affect: Mood normal.         Behavior: Behavior normal.         Thought Content: Thought content normal.        Assessment and Plan     1. Elevated PSA  -     Ambulatory referral/consult to Urology  -     PSA, Total (Diagnostic); Future; Expected date: 02/19/2025    2. Benign prostatic hyperplasia with incomplete bladder emptying    3. Nocturia  -     Urine Culture High Risk    4. Urgency of urination  -     Urine Culture High Risk    5. Frequency of urination  -     Urine Culture High Risk             1. Urine culture  2. PSA today  3. Continue tamsulosin (Flomax) 0.4 mg 1 capsule in the evening  4. Follow-up with urology NP AMY Ramsay in 2 weeks for PSA results and recommendations

## 2025-02-21 ENCOUNTER — RESULTS FOLLOW-UP (OUTPATIENT)
Dept: UROLOGY | Facility: CLINIC | Age: 75
End: 2025-02-21
Payer: MEDICARE

## 2025-02-21 LAB — UA COMPLETE W REFLEX CULTURE PNL UR: NORMAL

## 2025-02-21 NOTE — TELEPHONE ENCOUNTER
----- Message from Garry Caballero NP sent at 2/21/2025  8:17 AM CST -----  Please notify patient his PSA went down to 7.798 but this is still elevated remind him to keep his 2 week appointment for further recommendations of the elevated PSA.  Also his urine culture was   negative thanks  ----- Message -----  From: Lab, Background User  Sent: 2/19/2025   4:09 PM CST  To: Garry Caballero NP  I called pt and got no answer.  Left voice message I called from urology and will call back later.

## 2025-02-28 NOTE — PROGRESS NOTES
Subjective     Patient ID: Johnny Haynes is a 74 y.o. male.    Chief Complaint:  Follow-up of elevated PSA    This pleasant 74-year-old male presents to the clinic for follow-up of elevated PSA and BPH with LUTS.  Patient states he is doing good today.  He was initially referred to Urology for PSA of 9.890 on October 16, 2023.  We repeated the PSA on February 19, 2025 that was still elevated at 7.798.  He denies any family history of prostate cancer.  We discussed at the initial visit and again today the risk for prostate cancer.  We discussed doing a month of antibiotics and repeating the PSA in 3 months versus MRI of the prostate with possible prostate fusion biopsies versus prostate biopsies in the clinic.  Patient has opted to do a month of antibiotics and repeating the PSA in 3 months in lieu of the MRI of the prostate versus prostate biopsies in the clinic.  I discussed treating with a month of Cipro 500 mg 1 twice a day for a month as outlined in the plan.  I further discussed the side effects to include a rash, constipation and diarrhea.  He was encouraged to read up on the side effects of Cipro.  He denies any allergies to Cipro.  Further discussed with the patient that if the PSA does not returned to normal then we would recommend doing the MRI of the prostate with possible prostate fusion biopsies if no contraindications.  He is pleased today with the way he is voiding.  He is on Flomax 0.4 mg 1 capsule at bedtime and tolerating this medicine without side effects.  His IPSS score today is 15 that reflects incomplete bladder emptying, frequency, intermittency, urgency, weak stream, and nocturia 3 times a night.  His PVR was 056 MLS.  He denies dysuria, hematuria, or straining to urinate.  We discussed decreasing bladder irritants such as caffeine and carbonated beverages.  We discussed stopping fluid intake 2-3 hours before bedtime.  He is a smoker and denies any alcohol.  We discussed at the last visit  increasing the Flomax versus adding finasteride.  Through shared decision-making with the patient, he desires to do a month of antibiotics and repeat the PSA in 3 months.  He will consider further workup of the elevated PSA with MRI of the prostate when possible prostate fusion biopsies if indicated.  He desires to continue the Flomax 0.4 mg 1 at bedtime for now.  I will plan to see the patient back in the clinic in 3 months with a PSA.  I discussed the plan in detail with the patient and he is in agreement with the plan.  All his questions were answered at today's visit.  I spent 30 minutes counseling this patient, reviewing the chart, imaging and labs.    PSA history:  PSA was 7.798 on 02/19/2025  PSA was 9.890 on 10/16/2023  ------------------------------------------------------------  [March 6, 2025].       Review of Systems   Constitutional:  Negative for activity change and fever.   HENT:  Negative for hearing loss and trouble swallowing.    Eyes:  Negative for visual disturbance.   Respiratory:  Negative for cough, shortness of breath and wheezing.    Cardiovascular:  Negative for chest pain.   Gastrointestinal:  Negative for abdominal pain, diarrhea, nausea and vomiting.   Endocrine: Negative for polyuria.   Genitourinary:  Positive for frequency and urgency. Negative for bladder incontinence, decreased urine volume, difficulty urinating, discharge, dysuria, enuresis, erectile dysfunction, flank pain, genital sores, hematuria, penile pain, penile swelling, scrotal swelling and testicular pain.        Elevated PSA       BPH with LUTS and incomplete bladder emptying       Nocturia   Musculoskeletal:  Negative for back pain and gait problem.   Integumentary:  Negative for rash.   Neurological:  Negative for speech difficulty and weakness.   Psychiatric/Behavioral:  Negative for behavioral problems and confusion.           Objective     Physical Exam  Vitals and nursing note reviewed.   Constitutional:        General: He is not in acute distress.     Appearance: Normal appearance. He is not ill-appearing, toxic-appearing or diaphoretic.   HENT:      Head: Normocephalic.   Eyes:      Extraocular Movements: Extraocular movements intact.   Cardiovascular:      Rate and Rhythm: Normal rate and regular rhythm.      Heart sounds: Normal heart sounds.   Pulmonary:      Effort: Pulmonary effort is normal. No respiratory distress.      Breath sounds: Normal breath sounds. No wheezing, rhonchi or rales.   Abdominal:      General: Bowel sounds are normal.      Palpations: Abdomen is soft.      Tenderness: There is no abdominal tenderness. There is no right CVA tenderness, left CVA tenderness, guarding or rebound.   Genitourinary:     Rectum: Normal.      Comments: DREAD greater than 25 g, smooth, symmetrical, nontender, non indurated (done 02/19/2025)  Musculoskeletal:         General: Normal range of motion.      Cervical back: Normal range of motion. No rigidity.   Skin:     General: Skin is warm and dry.   Neurological:      General: No focal deficit present.      Mental Status: He is alert and oriented to person, place, and time.      Motor: No weakness.      Coordination: Coordination normal.      Gait: Gait normal.   Psychiatric:         Mood and Affect: Mood normal.         Behavior: Behavior normal.         Thought Content: Thought content normal.          Assessment and Plan     1. Elevated PSA  -     ciprofloxacin HCl (CIPRO) 500 MG tablet; Take 1 tablet in the morning and 1 tablet at night  Dispense: 60 tablet; Refill: 0  -     PSA, Total (Diagnostic); Future; Expected date: 06/06/2025    2. Benign prostatic hyperplasia with incomplete bladder emptying    3. Urgency of urination    4. Nocturia    5. Frequency of urination             1. Continue tamsulosin (Flomax) 0.4 mg 1 capsule every evening  2. Rx Cipro 500 mg 1 tablet in the morning and 1 tablet at night  --  discussed side effects to include a rash, constipation and  diarrhea--read up on side effects  3. Repeat PSA in 3 months  4. If PSA is still elevated then we will consider MRI of the prostate with possible prostate fusion biopsies versus prostate biopsies in the clinic  5. Decrease bladder irritants such as caffeine and carbonated beverages  6. Stop fluid intake 2-3 hours before bedtime  7. Follow-up with urology in 3 months

## 2025-03-06 ENCOUNTER — OFFICE VISIT (OUTPATIENT)
Dept: UROLOGY | Facility: CLINIC | Age: 75
End: 2025-03-06
Payer: MEDICARE

## 2025-03-06 VITALS
WEIGHT: 157 LBS | HEART RATE: 83 BPM | OXYGEN SATURATION: 98 % | BODY MASS INDEX: 26.13 KG/M2 | SYSTOLIC BLOOD PRESSURE: 159 MMHG | DIASTOLIC BLOOD PRESSURE: 67 MMHG

## 2025-03-06 DIAGNOSIS — R39.14 BENIGN PROSTATIC HYPERPLASIA WITH INCOMPLETE BLADDER EMPTYING: Chronic | ICD-10-CM

## 2025-03-06 DIAGNOSIS — R35.1 NOCTURIA: ICD-10-CM

## 2025-03-06 DIAGNOSIS — R35.0 FREQUENCY OF URINATION: ICD-10-CM

## 2025-03-06 DIAGNOSIS — N40.1 BENIGN PROSTATIC HYPERPLASIA WITH INCOMPLETE BLADDER EMPTYING: Chronic | ICD-10-CM

## 2025-03-06 DIAGNOSIS — R97.20 ELEVATED PSA: Primary | Chronic | ICD-10-CM

## 2025-03-06 DIAGNOSIS — R39.15 URGENCY OF URINATION: ICD-10-CM

## 2025-03-06 PROCEDURE — 99999 PR PBB SHADOW E&M-EST. PATIENT-LVL IV: CPT | Mod: PBBFAC,,, | Performed by: NURSE PRACTITIONER

## 2025-03-06 PROCEDURE — 99214 OFFICE O/P EST MOD 30 MIN: CPT | Mod: S$PBB,,, | Performed by: NURSE PRACTITIONER

## 2025-03-06 PROCEDURE — 99214 OFFICE O/P EST MOD 30 MIN: CPT | Mod: PBBFAC | Performed by: NURSE PRACTITIONER

## 2025-03-06 RX ORDER — CIPROFLOXACIN 500 MG/1
TABLET ORAL
Qty: 60 TABLET | Refills: 0 | Status: SHIPPED | OUTPATIENT
Start: 2025-03-06

## 2025-03-06 NOTE — PATIENT INSTRUCTIONS
1. Continue tamsulosin (Flomax) 0.4 mg 1 capsule every evening  2. Rx Cipro 500 mg 1 tablet in the morning and 1 tablet at night  --  discussed side effects to include a rash, constipation and diarrhea--read up on side effects  3. Repeat PSA in 3 months  4. If PSA is still elevated then we will consider MRI of the prostate with possible prostate fusion biopsies versus prostate biopsies in the clinic  5. Decrease bladder irritants such as caffeine and carbonated beverages  6. Stop fluid intake 2-3 hours before bedtime  7. Follow-up with urology in 3 months

## 2025-07-08 ENCOUNTER — OFFICE VISIT (OUTPATIENT)
Dept: DERMATOLOGY | Facility: CLINIC | Age: 75
End: 2025-07-08
Payer: MEDICARE

## 2025-07-08 DIAGNOSIS — Z85.820 HISTORY OF MELANOMA: Primary | ICD-10-CM

## 2025-07-08 DIAGNOSIS — D23.9 BLUE NEVUS: ICD-10-CM

## 2025-07-08 DIAGNOSIS — D22.9 MULTIPLE BENIGN NEVI: ICD-10-CM

## 2025-07-08 NOTE — PROGRESS NOTES
[facility]  Carroll Chandler MD    [address] [place] [address]  [phone]    Fax: [phone]    Patient Name: Johnny Haynes  Medical Record Number: [id number]  PCP: Shaye Watson MD  Age: 75 y.o. : 1950  Contact: [phone] (home)     History of Present Illness:     Johnny Haynes is a 75 y.o.  male here for follow up skin exam given hx of melanoma. (upper mid back excised 2023, Breslow depth 2.82 mm, stage: pT3a). His last PET scan a year ago was clear. He no longer follows with Dr. Shrestha (last visit a year ago)     He has not seen Dr. Shrestha in about a year.     The patient has no other concerns today.    Review of Systems:     Unremarkable other than mentioned above.     Physical Exam:     General: Relaxed, oriented, alert    Skin examination of the scalp, face, neck, chest, back, abdomen, upper extremities and lower extremities were normal except for as listed below    - no lymphadenopathy noted on exam today   Assessment and Plan:     1. History of malignant melanoma  - well healed scar with NER    Plan: Counseling  Patients with a history of melanoma should wear broad spectrum sunscreen and sun protective clothing.  Ecars from excisional sites of melanoma should be monitored for any recurrences. Monthly self-skin checks should be performed to monitor for any moles that change in size, shape or color, itch burn or bleed.  Contact Office if: Patient notices any new or changing moles, develops constitutional symptoms or develops new lesions within or around the previous melanoma scar.    - encouraged patient to re-establish care with Dr. Shrestha    2. Blue Nevus   - blue papule located on the back     Plan: Counseling  Monthly self-skin checks to monitor for any changes in moles are recommended.  Contact Office if: Any moles change in size, shape or color; itch, burn or bleed.    3. Benign appearing melanocytic nevi   - no lesions appear clinically  or dermatoscopically atypical enough to warrant biopsy at this time  - The patient was counseled on the ABCDE's of melanoma and on the importance of performing monthly self skin checks at home in between visits and will call our clinic with changes.  - discussed importance of sunscreen SPF 30 or greater       Carroll Chandler MD   Mohs Surgery/Dermatologic Oncology  Dermatology